# Patient Record
Sex: MALE | Race: WHITE | NOT HISPANIC OR LATINO | Employment: FULL TIME | URBAN - METROPOLITAN AREA
[De-identification: names, ages, dates, MRNs, and addresses within clinical notes are randomized per-mention and may not be internally consistent; named-entity substitution may affect disease eponyms.]

---

## 2022-10-06 ENCOUNTER — APPOINTMENT (EMERGENCY)
Dept: CT IMAGING | Facility: HOSPITAL | Age: 67
End: 2022-10-06
Payer: MEDICARE

## 2022-10-06 ENCOUNTER — TELEPHONE (OUTPATIENT)
Dept: UROLOGY | Facility: AMBULATORY SURGERY CENTER | Age: 67
End: 2022-10-06

## 2022-10-06 ENCOUNTER — HOSPITAL ENCOUNTER (EMERGENCY)
Facility: HOSPITAL | Age: 67
Discharge: HOME | End: 2022-10-06
Attending: EMERGENCY MEDICINE
Payer: MEDICARE

## 2022-10-06 VITALS
SYSTOLIC BLOOD PRESSURE: 134 MMHG | RESPIRATION RATE: 16 BRPM | HEART RATE: 60 BPM | WEIGHT: 195 LBS | OXYGEN SATURATION: 98 % | TEMPERATURE: 98.3 F | DIASTOLIC BLOOD PRESSURE: 72 MMHG | BODY MASS INDEX: 28.88 KG/M2 | HEIGHT: 69 IN

## 2022-10-06 DIAGNOSIS — R33.8 ACUTE URINARY RETENTION: ICD-10-CM

## 2022-10-06 DIAGNOSIS — K44.9 HIATAL HERNIA: ICD-10-CM

## 2022-10-06 DIAGNOSIS — N32.0 BLADDER OUTLET OBSTRUCTION: Primary | ICD-10-CM

## 2022-10-06 DIAGNOSIS — R79.89 ELEVATED SERUM CREATININE: ICD-10-CM

## 2022-10-06 DIAGNOSIS — M54.50 ACUTE LEFT-SIDED LOW BACK PAIN WITHOUT SCIATICA: ICD-10-CM

## 2022-10-06 DIAGNOSIS — K80.20 CHOLELITHIASIS: ICD-10-CM

## 2022-10-06 LAB
ALBUMIN SERPL BCP-MCNC: 4.1 G/DL (ref 3.5–5)
ALP SERPL-CCNC: 74 U/L (ref 46–116)
ALT SERPL W P-5'-P-CCNC: 32 U/L (ref 12–78)
ANION GAP SERPL CALCULATED.3IONS-SCNC: 9 MMOL/L (ref 4–13)
AST SERPL W P-5'-P-CCNC: 23 U/L (ref 5–45)
BASOPHILS # BLD AUTO: 0.05 THOUSANDS/ΜL (ref 0–0.1)
BASOPHILS NFR BLD AUTO: 1 % (ref 0–1)
BILIRUB SERPL-MCNC: 0.7 MG/DL (ref 0.2–1)
BILIRUB UR QL STRIP: NEGATIVE
BUN SERPL-MCNC: 14 MG/DL (ref 5–25)
CALCIUM SERPL-MCNC: 9.7 MG/DL (ref 8.3–10.1)
CHLORIDE SERPL-SCNC: 102 MMOL/L (ref 96–108)
CLARITY UR: CLEAR
CO2 SERPL-SCNC: 29 MMOL/L (ref 21–32)
COLOR UR: YELLOW
CREAT SERPL-MCNC: 1.36 MG/DL (ref 0.6–1.3)
EOSINOPHIL # BLD AUTO: 0.07 THOUSAND/ΜL (ref 0–0.61)
EOSINOPHIL NFR BLD AUTO: 1 % (ref 0–6)
ERYTHROCYTE [DISTWIDTH] IN BLOOD BY AUTOMATED COUNT: 14.2 % (ref 11.6–15.1)
GFR SERPL CREATININE-BSD FRML MDRD: 53 ML/MIN/1.73SQ M
GLUCOSE SERPL-MCNC: 133 MG/DL (ref 65–140)
GLUCOSE UR STRIP-MCNC: NEGATIVE MG/DL
HCT VFR BLD AUTO: 47.6 % (ref 36.5–49.3)
HGB BLD-MCNC: 16.1 G/DL (ref 12–17)
HGB UR QL STRIP.AUTO: NEGATIVE
IMM GRANULOCYTES # BLD AUTO: 0.05 THOUSAND/UL (ref 0–0.2)
IMM GRANULOCYTES NFR BLD AUTO: 1 % (ref 0–2)
KETONES UR STRIP-MCNC: NEGATIVE MG/DL
LEUKOCYTE ESTERASE UR QL STRIP: NEGATIVE
LIPASE SERPL-CCNC: 117 U/L (ref 73–393)
LYMPHOCYTES # BLD AUTO: 1.44 THOUSANDS/ΜL (ref 0.6–4.47)
LYMPHOCYTES NFR BLD AUTO: 14 % (ref 14–44)
MCH RBC QN AUTO: 29.5 PG (ref 26.8–34.3)
MCHC RBC AUTO-ENTMCNC: 33.8 G/DL (ref 31.4–37.4)
MCV RBC AUTO: 87 FL (ref 82–98)
MONOCYTES # BLD AUTO: 0.65 THOUSAND/ΜL (ref 0.17–1.22)
MONOCYTES NFR BLD AUTO: 6 % (ref 4–12)
NEUTROPHILS # BLD AUTO: 8.36 THOUSANDS/ΜL (ref 1.85–7.62)
NEUTS SEG NFR BLD AUTO: 77 % (ref 43–75)
NITRITE UR QL STRIP: NEGATIVE
NRBC BLD AUTO-RTO: 0 /100 WBCS
PH UR STRIP.AUTO: 6 [PH]
PLATELET # BLD AUTO: 224 THOUSANDS/UL (ref 149–390)
PMV BLD AUTO: 9 FL (ref 8.9–12.7)
POTASSIUM SERPL-SCNC: 4.3 MMOL/L (ref 3.5–5.3)
PROT SERPL-MCNC: 7.9 G/DL (ref 6.4–8.4)
PROT UR STRIP-MCNC: NEGATIVE MG/DL
RBC # BLD AUTO: 5.45 MILLION/UL (ref 3.88–5.62)
SODIUM SERPL-SCNC: 140 MMOL/L (ref 135–147)
SP GR UR STRIP.AUTO: 1.01 (ref 1–1.03)
UROBILINOGEN UR STRIP-ACNC: <2 MG/DL
WBC # BLD AUTO: 10.62 THOUSAND/UL (ref 4.31–10.16)

## 2022-10-06 PROCEDURE — 99284 EMERGENCY DEPT VISIT MOD MDM: CPT

## 2022-10-06 PROCEDURE — G1004 CDSM NDSC: HCPCS

## 2022-10-06 PROCEDURE — 99285 EMERGENCY DEPT VISIT HI MDM: CPT | Performed by: EMERGENCY MEDICINE

## 2022-10-06 PROCEDURE — 83690 ASSAY OF LIPASE: CPT | Performed by: EMERGENCY MEDICINE

## 2022-10-06 PROCEDURE — 96374 THER/PROPH/DIAG INJ IV PUSH: CPT

## 2022-10-06 PROCEDURE — 80053 COMPREHEN METABOLIC PANEL: CPT | Performed by: EMERGENCY MEDICINE

## 2022-10-06 PROCEDURE — 74176 CT ABD & PELVIS W/O CONTRAST: CPT

## 2022-10-06 PROCEDURE — 36415 COLL VENOUS BLD VENIPUNCTURE: CPT | Performed by: EMERGENCY MEDICINE

## 2022-10-06 PROCEDURE — 81003 URINALYSIS AUTO W/O SCOPE: CPT | Performed by: EMERGENCY MEDICINE

## 2022-10-06 PROCEDURE — 51798 US URINE CAPACITY MEASURE: CPT

## 2022-10-06 PROCEDURE — 85025 COMPLETE CBC W/AUTO DIFF WBC: CPT | Performed by: EMERGENCY MEDICINE

## 2022-10-06 PROCEDURE — 93005 ELECTROCARDIOGRAM TRACING: CPT

## 2022-10-06 RX ORDER — TAMSULOSIN HYDROCHLORIDE 0.4 MG/1
0.4 CAPSULE ORAL
Qty: 14 CAPSULE | Refills: 0 | Status: SHIPPED | OUTPATIENT
Start: 2022-10-06 | End: 2022-10-28 | Stop reason: SDUPTHER

## 2022-10-06 RX ORDER — KETOROLAC TROMETHAMINE 30 MG/ML
15 INJECTION, SOLUTION INTRAMUSCULAR; INTRAVENOUS ONCE
Status: COMPLETED | OUTPATIENT
Start: 2022-10-06 | End: 2022-10-06

## 2022-10-06 RX ORDER — LIDOCAINE 50 MG/G
1 PATCH TOPICAL ONCE
Status: DISCONTINUED | OUTPATIENT
Start: 2022-10-06 | End: 2022-10-06 | Stop reason: HOSPADM

## 2022-10-06 RX ADMIN — KETOROLAC TROMETHAMINE 15 MG: 30 INJECTION, SOLUTION INTRAMUSCULAR; INTRAVENOUS at 07:18

## 2022-10-06 RX ADMIN — LIDOCAINE 5% 1 PATCH: 700 PATCH TOPICAL at 07:17

## 2022-10-06 NOTE — TELEPHONE ENCOUNTER
Spoke with Briana Gresham and arnav  On speaker phone  Scheduled him at the [facility] office tomorrow with Dr Lowery Organ  Told them christina should hydrate with water - the blood is probably from the insertion of campbell as he has a large prostate - they stated the urine is an orangey color   Gave direction to the [facility] End office

## 2022-10-06 NOTE — PROGRESS NOTES
UROLOGY NEW CONSULT NOTE     CHIEF COMPLAINT   Bib Guzmán is a 78 y o  male with a complaint of   Chief Complaint   Patient presents with    Cystoscopy       History of Present Illness:     78 y o  male presented to the emergency room yesterday with left-sided flank pain  CT scan demonstrated significant hydronephrosis, and enlarged prostate with bladder diverticula  Patient had a Mendoza catheter placed for greater than 1 L of urinary retention  Was started on Flomax  Presents for discussion  Patient reports that he has not followed with medical providers regularly  Approximately 7-8 years ago, he did have a prostate exam was told he has an enlarged prostate  No prior PSA testing  Patient denies a family history of urinary issues or prostate cancer  Has been having progressive urinary symptoms oversewed many years  Does work as a   Past Medical History:   History reviewed  No pertinent past medical history  PAST SURGICAL HISTORY:     Past Surgical History:   Procedure Laterality Date    APPENDECTOMY      CYSTOSCOPY  10/07/2022    [place]       CURRENT MEDICATIONS:     Current Outpatient Medications   Medication Sig Dispense Refill    tamsulosin (FLOMAX) 0 4 mg Take 1 capsule (0 4 mg total) by mouth daily with dinner 14 capsule 0     No current facility-administered medications for this visit         ALLERGIES:   No Known Allergies    SOCIAL HISTORY:     Social History     Socioeconomic History    Marital status: /Civil Union     Spouse name: None    Number of children: None    Years of education: None    Highest education level: None   Occupational History    None   Tobacco Use    Smoking status: Never Smoker    Smokeless tobacco: Never Used   Vaping Use    Vaping Use: Never used   Substance and Sexual Activity    Alcohol use: Not Currently    Drug use: Never    Sexual activity: Yes     Partners: Female   Other Topics Concern    None   Social History Narrative    None     Social Determinants of Health     Financial Resource Strain: Not on file   Food Insecurity: Not on file   Transportation Needs: Not on file   Physical Activity: Not on file   Stress: Not on file   Social Connections: Not on file   Intimate Partner Violence: Not on file   Housing Stability: Not on file       SOCIAL HISTORY:   History reviewed  No pertinent family history  REVIEW OF SYSTEMS:     Review of Systems   Constitutional: Negative  Respiratory: Negative  Cardiovascular: Negative  Gastrointestinal: Negative  Genitourinary: Positive for difficulty urinating and penile pain  Musculoskeletal: Negative  Skin: Negative  Psychiatric/Behavioral: Negative  PHYSICAL EXAM:     /70 (BP Location: Left arm, Patient Position: Sitting, Cuff Size: Adult)   Pulse 66   Ht 5' 9" (1 753 m)   Wt 91 2 kg (201 lb)   BMI 29 68 kg/m²     General:  Healthy appearing male in no acute distress  They have a normal affect  There is not appear to be any gross neurologic defects or abnormalities  HEENT:  Normocephalic, atraumatic  Neck is supple without any palpable lymphadenopathy  Cardiovascular:  Patient has normal palpable distal radial pulses  There is no significant peripheral edema  No JVD is noted  Respiratory:  Patient has unlabored respirations  There is no audible wheeze or rhonchi  Abdomen:  Abdomen is with healed appendectomy surgical scars  Abdomen is soft and nontender  There is no tympany  Inguinal and umbilical hernia are not appreciated  Genitourinary: no penile lesions or discharge, no testicular masses or tenderness, no hernias, prostate is smooth without nodule    Musculoskeletal:  Patient does not have significant CVA tenderness in the  flank with palpation or percussion  They full range of motion in all 4 extremities  Strength in all 4 extremities appears congruent    Patient is able to ambulate without assistance or difficulty  Dermatologic:  Patient has no skin abnormalities or rashes  LABS:     CBC:   Lab Results   Component Value Date    WBC 10 62 (H) 10/06/2022    HGB 16 1 10/06/2022    HCT 47 6 10/06/2022    MCV 87 10/06/2022     10/06/2022       BMP:   Lab Results   Component Value Date    CALCIUM 9 7 10/06/2022    K 4 3 10/06/2022    CO2 29 10/06/2022     10/06/2022    BUN 14 10/06/2022    CREATININE 1 36 (H) 10/06/2022       IMAGING:     10/6/22  CT ABDOMEN AND PELVIS WITHOUT IV CONTRAST - LOW DOSE RENAL STONE      INDICATION:   Flank pain, kidney stone suspected  left flank pain      COMPARISON:  None      TECHNIQUE:  Low radiation dose thin section CT examination of the abdomen and pelvis was performed without intravenous or oral contrast according to a protocol specifically designed to evaluate for urinary tract calculus  Axial, sagittal, and coronal 2D   reformatted images were created from the source data and submitted for interpretation  Evaluation for pathology in the abdomen and pelvis that is unrelated to urinary tract calculi is limited        Radiation dose length product (DLP) for this visit:  415 mGy-cm   This examination, like all CT scans performed in the [facility], was performed utilizing techniques to minimize radiation dose exposure, including the use of iterative   reconstruction and automated exposure control      URINARY TRACT FINDINGS:     RIGHT KIDNEY AND URETER:  Moderate severe right hydroureteronephrosis is noted  No urinary calculi are identified      LEFT KIDNEY AND URETER:  Moderate to severe left hydroureteronephrosis is identified  No urinary calculi identified  Marked bladder distention is identified  Multiple bladder diverticula are present      URINARY BLADDER:  Unremarkable         ADDITIONAL FINDINGS:     LOWER CHEST:  Small hiatal hernia noted    No other clinically significant abnormality identified in the visualized lower chest      SOLID VISCERA: Limited low radiation dose noncontrast CT evaluation demonstrates no clinically significant abnormality of the imaged portions of the liver, spleen, pancreas, or adrenal glands        GALLBLADDER/BILIARY TREE:  There are gallstone(s) within the gallbladder, without pericholecystic inflammatory changes  No biliary dilatation      STOMACH AND BOWEL:  Unremarkable      APPENDIX:  No findings to suggest appendicitis      ABDOMINOPELVIC CAVITY:  No ascites  No pneumoperitoneum  No lymphadenopathy      REPRODUCTIVE ORGANS:  Unremarkable for patient's age      ABDOMINAL WALL/INGUINAL REGIONS:  Unremarkable      OSSEOUS STRUCTURES:  No acute fracture or destructive osseous lesion      IMPRESSION:     Marked bladder distention and moderate to severe bilateral hydroureteronephrosis  Multiple bladder diverticula  Findings likely on the basis of chronic bladder outlet obstruction  No obstructing calculi identified      Cholelithiasis      Small hiatal hernia  PROCEDURE:     SEE NOTE     ASSESSMENT:     78 y o  male with bladder outlet obstruction and hydronephrosis    PLAN:     Unfortunately, the patient was unable to void today  Mendoza catheter was replaced  Agree with Flomax in the interim  Patient will need follow-up ultrasound within the next 1-2 weeks to reassess hydronephrosis seen during ER visit  Given the signs of bladder trabeculation on cystoscopy and his ability to easily hold >500 cc of fluid, patient appears to have longstanding outlet obstruction  Patient will need at least 2-3 weeks of decompression before void trial re-approached  Patient will need an new PSA however given the recent Mendoza catheter placement and today's scope, would wait at least 2 weeks before checking  If the patient has a catheter in at that time, number could be falsely elevated      If the patient is unable to void at his upcoming trial, would consider urodynamic testing given the relatively small size of his prostate and minimal signs of outlet obstruction

## 2022-10-06 NOTE — ED PROVIDER NOTES
History  Chief Complaint   Patient presents with    Flank Pain     Pt reports left flank pain x 24 hours  Denies injury, denies hematuria  78year old male presents for evaluation of left flank pain which began while at work yesterday afternoon  Patient describes the pain as constant with occasional stabbing pains  Pain worsens with movement  No alleviating factors  No hematuria, dysuria or frequency  No history of renal stones in the past  Patient states he was unable to sleep last night due to the pain  Transient diaphoresis and nausea this morning  Patient has not taken anything for his symptoms today  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  No history of prior back strain  History provided by:  Patient  Flank Pain  Pain location:  L flank  Pain quality: stabbing    Pain radiates to:  Does not radiate  Onset quality:  Gradual  Duration:  1 day  Timing:  Constant  Progression:  Waxing and waning  Chronicity:  New  Relieved by:  Nothing  Worsened by: Movement  Ineffective treatments:  None tried  Associated symptoms: nausea    Associated symptoms: no chest pain, no constipation, no cough, no diarrhea, no fatigue, no fever, no shortness of breath, no sore throat and no vomiting        None       History reviewed  No pertinent past medical history  Past Surgical History:   Procedure Laterality Date    APPENDECTOMY         History reviewed  No pertinent family history  I have reviewed and agree with the history as documented      E-Cigarette/Vaping    E-Cigarette Use Never User      E-Cigarette/Vaping Substances    Nicotine No     THC No     CBD No     Flavoring No     Other No     Unknown No      Social History     Tobacco Use    Smoking status: Never Smoker    Smokeless tobacco: Never Used   Vaping Use    Vaping Use: Never used   Substance Use Topics    Alcohol use: Not Currently    Drug use: Never       Review of Systems   Constitutional: Positive for diaphoresis  Negative for appetite change, fatigue and fever  HENT: Negative for congestion and sore throat  Respiratory: Negative for cough and shortness of breath  Cardiovascular: Negative for chest pain  Gastrointestinal: Positive for nausea  Negative for abdominal pain, constipation, diarrhea and vomiting  Genitourinary: Positive for flank pain  Musculoskeletal: Negative for myalgias  Skin: Negative for rash and wound  Neurological: Negative for dizziness, syncope and headaches  All other systems reviewed and are negative  Physical Exam  Physical Exam  Vitals and nursing note reviewed  Constitutional:       General: He is not in acute distress  Appearance: He is well-developed  He is not toxic-appearing or diaphoretic  HENT:      Head: Normocephalic and atraumatic  Right Ear: External ear normal       Left Ear: External ear normal       Nose: Nose normal    Eyes:      General: No scleral icterus  Cardiovascular:      Rate and Rhythm: Normal rate and regular rhythm  Pulses: Normal pulses  Pulmonary:      Effort: Pulmonary effort is normal  No respiratory distress  Abdominal:      General: There is no distension  Tenderness: There is no abdominal tenderness  There is no right CVA tenderness or left CVA tenderness  Musculoskeletal:         General: No deformity  Normal range of motion  Comments: Muscular tenderness left lower back  No midline C/T/L spine tenderness  No step offs or deformities  Skin:     Findings: No rash  Neurological:      General: No focal deficit present  Mental Status: He is alert        Gait: Gait normal    Psychiatric:         Mood and Affect: Mood normal          Vital Signs  ED Triage Vitals [10/06/22 0654]   Temperature Pulse Respirations Blood Pressure SpO2   98 3 °F (36 8 °C) 60 16 134/72 98 %      Temp Source Heart Rate Source Patient Position - Orthostatic VS BP Location FiO2 (%)   Temporal Monitor Sitting Right arm --      Pain Score       5           Vitals:    10/06/22 0654   BP: 134/72   Pulse: 60   Patient Position - Orthostatic VS: Sitting         Visual Acuity      ED Medications  Medications   lidocaine (LIDODERM) 5 % patch 1 patch (1 patch Topical Medication Applied 10/6/22 0717)   sodium chloride 0 9 % bolus 1,000 mL (has no administration in time range)   ketorolac (TORADOL) injection 15 mg (15 mg Intravenous Given 10/6/22 0718)       Diagnostic Studies  Results Reviewed     Procedure Component Value Units Date/Time    UA w Reflex to Microscopic w Reflex to Culture [[id number]] Collected: 10/06/22 0745    Lab Status: Final result Specimen: Urine, Clean Catch Updated: 10/06/22 0753     Color, UA Yellow     Clarity, UA Clear     Specific Gravity, UA 1 015     pH, UA 6 0     Leukocytes, UA Negative     Nitrite, UA Negative     Protein, UA Negative mg/dl      Glucose, UA Negative mg/dl      Ketones, UA Negative mg/dl      Urobilinogen, UA <2 0 mg/dl      Bilirubin, UA Negative     Occult Blood, UA Negative    Comprehensive metabolic panel [[id number]]  (Abnormal) Collected: 10/06/22 0716    Lab Status: Final result Specimen: Blood from Arm, Right Updated: 10/06/22 0739     Sodium 140 mmol/L      Potassium 4 3 mmol/L      Chloride 102 mmol/L      CO2 29 mmol/L      ANION GAP 9 mmol/L      BUN 14 mg/dL      Creatinine 1 36 mg/dL      Glucose 133 mg/dL      Calcium 9 7 mg/dL      AST 23 U/L      ALT 32 U/L      Alkaline Phosphatase 74 U/L      Total Protein 7 9 g/dL      Albumin 4 1 g/dL      Total Bilirubin 0 70 mg/dL      eGFR 53 ml/min/1 73sq m     Narrative:      [place] guidelines for Chronic Kidney Disease (CKD):     Stage 1 with normal or high GFR (GFR > 90 mL/min/1 73 square meters)    Stage 2 Mild CKD (GFR = 60-89 mL/min/1 73 square meters)    Stage 3A Moderate CKD (GFR = 45-59 mL/min/1 73 square meters)    Stage 3B Moderate CKD (GFR = 30-44 mL/min/1 73 square meters)    Stage 4 Severe CKD (GFR = 15-29 mL/min/1 73 square meters)    Stage 5 End Stage CKD (GFR <15 mL/min/1 73 square meters)  Note: GFR calculation is accurate only with a steady state creatinine    Lipase [[id number]]  (Normal) Collected: 10/06/22 0716    Lab Status: Final result Specimen: Blood from Arm, Right Updated: 10/06/22 0739     Lipase 117 u/L     CBC and differential [[id number]]  (Abnormal) Collected: 10/06/22 0716    Lab Status: Final result Specimen: Blood from Arm, Right Updated: 10/06/22 0724     WBC 10 62 Thousand/uL      RBC 5 45 Million/uL      Hemoglobin 16 1 g/dL      Hematocrit 47 6 %      MCV 87 fL      MCH 29 5 pg      MCHC 33 8 g/dL      RDW 14 2 %      MPV 9 0 fL      Platelets 682 Thousands/uL      nRBC 0 /100 WBCs      Neutrophils Relative 77 %      Immat GRANS % 1 %      Lymphocytes Relative 14 %      Monocytes Relative 6 %      Eosinophils Relative 1 %      Basophils Relative 1 %      Neutrophils Absolute 8 36 Thousands/µL      Immature Grans Absolute 0 05 Thousand/uL      Lymphocytes Absolute 1 44 Thousands/µL      Monocytes Absolute 0 65 Thousand/µL      Eosinophils Absolute 0 07 Thousand/µL      Basophils Absolute 0 05 Thousands/µL                  CT renal stone study abdomen pelvis without contrast   Final Result by Swapna Herrera MD (10/06 4341)      Marked bladder distention and moderate to severe bilateral hydroureteronephrosis  Multiple bladder diverticula  Findings likely on the basis of chronic bladder outlet obstruction  No obstructing calculi identified  Cholelithiasis  Small hiatal hernia           Workstation performed: [id number]                    Procedures  ECG 12 Lead Documentation Only    Date/Time: 10/6/2022 7:23 AM  Performed by: Ines Boss MD  Authorized by: Ines Boss MD     Indications / Diagnosis:  Nausea and diaphoresis  ECG reviewed by me, the ED Provider: yes    Patient location:  ED  Previous ECG: Previous ECG:  Unavailable  Interpretation:     Interpretation: normal    Rate:     ECG rate:  56    ECG rate assessment: bradycardic    Rhythm:     Rhythm: sinus bradycardia    Ectopy:     Ectopy: none    QRS:     QRS axis:  Normal    QRS intervals:  Normal  Conduction:     Conduction: normal    ST segments:     ST segments:  Normal  T waves:     T waves: normal               ED Course  ED Course as of 10/06/22 0946   Thu Oct 06, 2022   0740 Creatinine(!): 1 36  No prior for comparison                                SBIRT 22yo+    Flowsheet Row Most Recent Value   SBIRT (23 yo +)    In order to provide better care to our patients, we are screening all of our patients for alcohol and drug use  Would it be okay to ask you these screening questions? Yes Filed at: 10/06/2022 3414   Initial Alcohol Screen: US AUDIT-C     1  How often do you have a drink containing alcohol? 0 Filed at: 10/06/2022 0705   2  How many drinks containing alcohol do you have on a typical day you are drinking? 0 Filed at: 10/06/2022 0705   3a  Male UNDER 65: How often do you have five or more drinks on one occasion? 0 Filed at: 10/06/2022 0705   3b  FEMALE Any Age, or MALE 65+: How often do you have 4 or more drinks on one occassion? 0 Filed at: 10/06/2022 0705   Audit-C Score 0 Filed at: 10/06/2022 3120   CHRISTY: How many times in the past year have you    Used an illegal drug or used a prescription medication for non-medical reasons? Never Filed at: 10/06/2022 0705                    MDM  Number of Diagnoses or Management Options  Acute left-sided low back pain without sciatica: new and requires workup  Acute urinary retention: new and requires workup  Bladder outlet obstruction: new and requires workup  Cholelithiasis: minor  Elevated serum creatinine: new and requires workup  Hiatal hernia: minor  Diagnosis management comments: 78year old male presents for evaluation of left flank pain   Left lumbar muscular tenderness on exam  Labs with slight elevation in creatinine  No baseline for comparison  CT stone protocol negative for stone, but does show evidence of bladder outlet obstruction with hydronephrosis  Post void residual >1L  Mendoza catheter placed  Flomax  Urology follow up  Amount and/or Complexity of Data Reviewed  Clinical lab tests: ordered and reviewed  Tests in the radiology section of CPT®: ordered and reviewed    Patient Progress  Patient progress: stable      Disposition  Final diagnoses:   Elevated serum creatinine   Bladder outlet obstruction   Hiatal hernia   Cholelithiasis   Acute left-sided low back pain without sciatica   Acute urinary retention     Time reflects when diagnosis was documented in both MDM as applicable and the Disposition within this note     Time User Action Codes Description Comment    10/6/2022  7:41 AM Ja Dunia Add [R79 89] Elevated serum creatinine     10/6/2022  8:41 AM Ja Dunia Add [N32 0] Bladder outlet obstruction     10/6/2022  8:42 AM Man Riding J Add [K44 9] Hiatal hernia     10/6/2022  8:42 AM Ja Dunia Add [K80 20] Cholelithiasis     10/6/2022  8:42 AM Ja Dunia Modify [R79 89] Elevated serum creatinine     10/6/2022  8:42 AM Ja Dunia Modify [N32 0] Bladder outlet obstruction     10/6/2022  8:42 AM Sudhakar Kraus Add [M54 50] Acute left-sided low back pain without sciatica     10/6/2022  9:12 AM Ja Dunia Add [R33 8] Acute urinary retention       ED Disposition     ED Disposition   Discharge    Condition   Stable    Date/Time   Thu Oct 6, 2022  9:43 AM    Comment   Jay Bermeo discharge to home/self care                 Follow-up Information     Follow up With Specialties Details Why Contact Info Additional [address] For Urology [facility] Urology Schedule an appointment as soon as possible for a visit in 1 week for re-evaluation Dipti Higgins [address] [id number] [phone] LT [address] Urology [facility], [facility], [facility] [place], [facility], [place], [address]     [address] Emergency Department Emergency Medicine Go to  If symptoms worsen [address] [place], [id number] [phone] [address] Emergency Department, [address] [place] [address], [facility], [address]          Patient's Medications   Discharge Prescriptions    TAMSULOSIN (FLOMAX) 0 4 MG    Take 1 capsule (0 4 mg total) by mouth daily with dinner       Start Date: 10/6/2022 End Date: --       Order Dose: 0 4 mg       Quantity: 14 capsule    Refills: 0           PDMP Review     None          ED Provider  Electronically Signed by           Jass Saez MD  10/06/22 6491

## 2022-10-06 NOTE — Clinical Note
Aristides Hutton was seen and treated in our emergency department on 10/6/2022  Diagnosis:     Rox [place]    He may return on this date: 10/10/2022         If you have any questions or concerns, please don't hesitate to call        Danny Cedillo MD    ______________________________           _______________          _______________  Hospital Representative                              Date                                Time
few crackles

## 2022-10-06 NOTE — TELEPHONE ENCOUNTER
Please Triage  New Patient    What is the reason for the patients appointment? ED f/u urinary retention blood in urine only since cath was placed     What office location does the patient prefer? [place]     Imaging/Lab Results:    Do we accept the patient's insurance or is the patient Self-Pay? Medicare   Insurance Provider:  Plan Type/Number:  Member ID#: Has the patient had any previous Urologist(s)? No     Have patient records been requested? If not are records showing in [address] [place] [address] Rd: records in [facility]     Has the patient had any outside testing done? No     Does the patient have a personal history of cancer?  No    Pt call [phone] Shiva Tobias (wife)

## 2022-10-07 ENCOUNTER — PROCEDURE VISIT (OUTPATIENT)
Dept: UROLOGY | Facility: CLINIC | Age: 67
End: 2022-10-07
Payer: MEDICARE

## 2022-10-07 VITALS
DIASTOLIC BLOOD PRESSURE: 70 MMHG | HEIGHT: 69 IN | HEART RATE: 66 BPM | SYSTOLIC BLOOD PRESSURE: 122 MMHG | WEIGHT: 201 LBS | BODY MASS INDEX: 29.77 KG/M2

## 2022-10-07 DIAGNOSIS — R33.8 ACUTE URINARY RETENTION: ICD-10-CM

## 2022-10-07 DIAGNOSIS — Z12.5 SCREENING FOR PROSTATE CANCER: ICD-10-CM

## 2022-10-07 DIAGNOSIS — R33.9 URINARY RETENTION: ICD-10-CM

## 2022-10-07 DIAGNOSIS — N32.3 DIVERTICULA, BLADDER: ICD-10-CM

## 2022-10-07 DIAGNOSIS — N13.30 HYDRONEPHROSIS, UNSPECIFIED HYDRONEPHROSIS TYPE: Primary | ICD-10-CM

## 2022-10-07 LAB — POST-VOID RESIDUAL VOLUME, ML POC: 555 ML

## 2022-10-07 PROCEDURE — 52000 CYSTOURETHROSCOPY: CPT | Performed by: UROLOGY

## 2022-10-07 PROCEDURE — 76872 US TRANSRECTAL: CPT | Performed by: UROLOGY

## 2022-10-07 PROCEDURE — 99204 OFFICE O/P NEW MOD 45 MIN: CPT | Performed by: UROLOGY

## 2022-10-07 PROCEDURE — 51798 US URINE CAPACITY MEASURE: CPT | Performed by: UROLOGY

## 2022-10-07 NOTE — PROGRESS NOTES
Cystoscopy     Date/Time 10/7/2022 8:46 AM     Performed by  Bret Rosado MD     Authorized by Bret Rosado MD      Universal Protocol:  Timeout called at: 10/7/2022 8:46 AM       Procedure Details:  Procedure type: cystoscopy    Patient tolerance: Patient tolerated the procedure well with no immediate complications    Additional Procedure Details:   Cystoscopy procedure note:    Risk and benefits of flexible cystoscopy were discussed  Informed consent was obtained  Urine dip was adequate for cystoscopy  The patient was placed in the supine position  His genitalia was prepped with Betadine and draped in a sterile fashion  Viscous 2% lidocaine jelly was instilled into the urethra and allowed dwell time for local anesthesia  Flexible cystoscopy was then performed using a 16F scope  The distal urethra and prostatic urethra were evaluated and were normal in course and caliber  Patient's prostate was short in length with minimal occlusion  Once inside the bladder, it was carefully inspected in a 360 degree fashion  Patient clearly had a very capacious bladder  Although I did not see a large diverticula, there was significant trabeculation  There was some adherent clot in the right dome from recent catheter placement  Both ureteral orifices in their orthotopic location were visualized with clear efflux of urine  Retroflexion for evaluation of the bladder neck was normal   No intravesical component noted  Overall this was a cystoscopy with signs of longstanding bladder outlet obstruction  Patient easily tolerated 500 cc of fluid without difficulty  Patient was then turned the left lateral decubitus position and transrectal ultrasound probe was placed after rectal exam   Prostate measured 37 g in 3 dimensional measurement  Patient was then given the opportunity to urinate in the uroflow commode  Patient was unable to void and postvoid residual was 550 cc    In a sterile fashion, 16 Hebrew coude tip Mendoza catheter was then placed to drain 550 cc of fluid  Patient tolerated procedure well

## 2022-10-08 LAB
ATRIAL RATE: 56 BPM
P AXIS: 64 DEGREES
PR INTERVAL: 142 MS
QRS AXIS: 33 DEGREES
QRSD INTERVAL: 82 MS
QT INTERVAL: 400 MS
QTC INTERVAL: 386 MS
T WAVE AXIS: 30 DEGREES
VENTRICULAR RATE: 56 BPM

## 2022-10-08 PROCEDURE — 93010 ELECTROCARDIOGRAM REPORT: CPT | Performed by: INTERNAL MEDICINE

## 2022-10-11 ENCOUNTER — HOSPITAL ENCOUNTER (OUTPATIENT)
Dept: ULTRASOUND IMAGING | Facility: HOSPITAL | Age: 67
Discharge: HOME | DRG: 698 | End: 2022-10-11
Attending: UROLOGY
Payer: MEDICARE

## 2022-10-11 DIAGNOSIS — R33.8 ACUTE URINARY RETENTION: ICD-10-CM

## 2022-10-11 PROCEDURE — 76775 US EXAM ABDO BACK WALL LIM: CPT

## 2022-10-12 ENCOUNTER — NURSE TRIAGE (OUTPATIENT)
Dept: OTHER | Facility: OTHER | Age: 67
End: 2022-10-12

## 2022-10-12 ENCOUNTER — APPOINTMENT (EMERGENCY)
Dept: CT IMAGING | Facility: HOSPITAL | Age: 67
DRG: 698 | End: 2022-10-12
Payer: MEDICARE

## 2022-10-12 ENCOUNTER — HOSPITAL ENCOUNTER (INPATIENT)
Facility: HOSPITAL | Age: 67
LOS: 3 days | Discharge: HOME | DRG: 698 | End: 2022-10-15
Attending: EMERGENCY MEDICINE | Admitting: INTERNAL MEDICINE
Payer: MEDICARE

## 2022-10-12 DIAGNOSIS — N12 PYELONEPHRITIS: Primary | ICD-10-CM

## 2022-10-12 DIAGNOSIS — R79.89 ABNORMAL LFTS: ICD-10-CM

## 2022-10-12 LAB
ALBUMIN SERPL BCP-MCNC: 3.5 G/DL (ref 3.5–5)
ALP SERPL-CCNC: 163 U/L (ref 46–116)
ALT SERPL W P-5'-P-CCNC: 304 U/L (ref 12–78)
ANION GAP SERPL CALCULATED.3IONS-SCNC: 8 MMOL/L (ref 4–13)
APTT PPP: 28 SECONDS (ref 23–37)
AST SERPL W P-5'-P-CCNC: 200 U/L (ref 5–45)
BACTERIA UR QL AUTO: ABNORMAL /HPF
BASOPHILS # BLD AUTO: 0.04 THOUSANDS/ΜL (ref 0–0.1)
BASOPHILS NFR BLD AUTO: 0 % (ref 0–1)
BILIRUB SERPL-MCNC: 1.4 MG/DL (ref 0.2–1)
BILIRUB UR QL STRIP: NEGATIVE
BUN SERPL-MCNC: 15 MG/DL (ref 5–25)
CALCIUM SERPL-MCNC: 9.6 MG/DL (ref 8.3–10.1)
CHLORIDE SERPL-SCNC: 100 MMOL/L (ref 96–108)
CLARITY UR: ABNORMAL
CO2 SERPL-SCNC: 25 MMOL/L (ref 21–32)
COLOR UR: YELLOW
CREAT SERPL-MCNC: 1.27 MG/DL (ref 0.6–1.3)
EOSINOPHIL # BLD AUTO: 0 THOUSAND/ΜL (ref 0–0.61)
EOSINOPHIL NFR BLD AUTO: 0 % (ref 0–6)
ERYTHROCYTE [DISTWIDTH] IN BLOOD BY AUTOMATED COUNT: 14.1 % (ref 11.6–15.1)
GFR SERPL CREATININE-BSD FRML MDRD: 58 ML/MIN/1.73SQ M
GLUCOSE SERPL-MCNC: 129 MG/DL (ref 65–140)
GLUCOSE UR STRIP-MCNC: NEGATIVE MG/DL
HCT VFR BLD AUTO: 43 % (ref 36.5–49.3)
HGB BLD-MCNC: 14.9 G/DL (ref 12–17)
HGB UR QL STRIP.AUTO: ABNORMAL
IMM GRANULOCYTES # BLD AUTO: 0.09 THOUSAND/UL (ref 0–0.2)
IMM GRANULOCYTES NFR BLD AUTO: 1 % (ref 0–2)
INR PPP: 0.98 (ref 0.84–1.19)
KETONES UR STRIP-MCNC: NEGATIVE MG/DL
LACTATE SERPL-SCNC: 1 MMOL/L (ref 0.5–2)
LEUKOCYTE ESTERASE UR QL STRIP: ABNORMAL
LYMPHOCYTES # BLD AUTO: 0.89 THOUSANDS/ΜL (ref 0.6–4.47)
LYMPHOCYTES NFR BLD AUTO: 5 % (ref 14–44)
MCH RBC QN AUTO: 29.9 PG (ref 26.8–34.3)
MCHC RBC AUTO-ENTMCNC: 34.7 G/DL (ref 31.4–37.4)
MCV RBC AUTO: 86 FL (ref 82–98)
MONOCYTES # BLD AUTO: 1.2 THOUSAND/ΜL (ref 0.17–1.22)
MONOCYTES NFR BLD AUTO: 6 % (ref 4–12)
MUCOUS THREADS UR QL AUTO: ABNORMAL
NEUTROPHILS # BLD AUTO: 17.43 THOUSANDS/ΜL (ref 1.85–7.62)
NEUTS SEG NFR BLD AUTO: 88 % (ref 43–75)
NITRITE UR QL STRIP: POSITIVE
NON-SQ EPI CELLS URNS QL MICRO: ABNORMAL /HPF
NRBC BLD AUTO-RTO: 0 /100 WBCS
OTHER STN SPEC: ABNORMAL
PH UR STRIP.AUTO: 7.5 [PH]
PLATELET # BLD AUTO: 197 THOUSANDS/UL (ref 149–390)
PMV BLD AUTO: 9 FL (ref 8.9–12.7)
POTASSIUM SERPL-SCNC: 4.1 MMOL/L (ref 3.5–5.3)
PROCALCITONIN SERPL-MCNC: 0.4 NG/ML
PROT SERPL-MCNC: 7.7 G/DL (ref 6.4–8.4)
PROT UR STRIP-MCNC: ABNORMAL MG/DL
PROTHROMBIN TIME: 13.7 SECONDS (ref 11.6–14.5)
RBC # BLD AUTO: 4.98 MILLION/UL (ref 3.88–5.62)
RBC #/AREA URNS AUTO: ABNORMAL /HPF
SODIUM SERPL-SCNC: 133 MMOL/L (ref 135–147)
SP GR UR STRIP.AUTO: 1.01 (ref 1–1.03)
UROBILINOGEN UR STRIP-ACNC: <2 MG/DL
WBC # BLD AUTO: 19.65 THOUSAND/UL (ref 4.31–10.16)
WBC #/AREA URNS AUTO: ABNORMAL /HPF

## 2022-10-12 PROCEDURE — 87186 SC STD MICRODIL/AGAR DIL: CPT | Performed by: EMERGENCY MEDICINE

## 2022-10-12 PROCEDURE — 96361 HYDRATE IV INFUSION ADD-ON: CPT

## 2022-10-12 PROCEDURE — 84145 PROCALCITONIN (PCT): CPT | Performed by: EMERGENCY MEDICINE

## 2022-10-12 PROCEDURE — 96365 THER/PROPH/DIAG IV INF INIT: CPT

## 2022-10-12 PROCEDURE — 80053 COMPREHEN METABOLIC PANEL: CPT | Performed by: EMERGENCY MEDICINE

## 2022-10-12 PROCEDURE — 74177 CT ABD & PELVIS W/CONTRAST: CPT

## 2022-10-12 PROCEDURE — 36415 COLL VENOUS BLD VENIPUNCTURE: CPT

## 2022-10-12 PROCEDURE — 83605 ASSAY OF LACTIC ACID: CPT | Performed by: EMERGENCY MEDICINE

## 2022-10-12 PROCEDURE — 87086 URINE CULTURE/COLONY COUNT: CPT | Performed by: EMERGENCY MEDICINE

## 2022-10-12 PROCEDURE — 81001 URINALYSIS AUTO W/SCOPE: CPT | Performed by: EMERGENCY MEDICINE

## 2022-10-12 PROCEDURE — 87040 BLOOD CULTURE FOR BACTERIA: CPT | Performed by: EMERGENCY MEDICINE

## 2022-10-12 PROCEDURE — 85730 THROMBOPLASTIN TIME PARTIAL: CPT | Performed by: EMERGENCY MEDICINE

## 2022-10-12 PROCEDURE — 85025 COMPLETE CBC W/AUTO DIFF WBC: CPT | Performed by: EMERGENCY MEDICINE

## 2022-10-12 PROCEDURE — 96375 TX/PRO/DX INJ NEW DRUG ADDON: CPT

## 2022-10-12 PROCEDURE — 99285 EMERGENCY DEPT VISIT HI MDM: CPT

## 2022-10-12 PROCEDURE — 87077 CULTURE AEROBIC IDENTIFY: CPT | Performed by: EMERGENCY MEDICINE

## 2022-10-12 PROCEDURE — G1004 CDSM NDSC: HCPCS

## 2022-10-12 PROCEDURE — 85610 PROTHROMBIN TIME: CPT | Performed by: EMERGENCY MEDICINE

## 2022-10-12 RX ORDER — CEFEPIME HYDROCHLORIDE 2 G/50ML
2000 INJECTION, SOLUTION INTRAVENOUS ONCE
Status: COMPLETED | OUTPATIENT
Start: 2022-10-12 | End: 2022-10-12

## 2022-10-12 RX ORDER — SODIUM CHLORIDE 9 MG/ML
3 INJECTION INTRAVENOUS ONCE
Status: COMPLETED | OUTPATIENT
Start: 2022-10-12 | End: 2022-10-12

## 2022-10-12 RX ADMIN — IOHEXOL 85 ML: 350 INJECTION, SOLUTION INTRAVENOUS at 22:13

## 2022-10-12 RX ADMIN — SODIUM CHLORIDE 3 ML: 9 INJECTION, SOLUTION INTRAMUSCULAR; INTRAVENOUS; SUBCUTANEOUS at 20:15

## 2022-10-12 RX ADMIN — CEFEPIME HYDROCHLORIDE 2000 MG: 2 INJECTION, SOLUTION INTRAVENOUS at 20:11

## 2022-10-12 RX ADMIN — SODIUM CHLORIDE 1000 ML: 0.9 INJECTION, SOLUTION INTRAVENOUS at 20:29

## 2022-10-12 NOTE — TELEPHONE ENCOUNTER
Regardin 8 fever and going up  post procedure - blood in cath bag   ----- Message from Alice Mccray MA sent at 10/12/2022  5:25 PM EDT -----  "I am calling bc I already spoke with the office earlier today, he came home wasn't feeling well  He just empied cath bag and there was a lot of blood   He is now running fever of 102 8 and really not feeling well "

## 2022-10-12 NOTE — TELEPHONE ENCOUNTER
White or yellow discharge at meatus is normal, body natural lubrication response to catheter  All good   Keep  F/u as planned

## 2022-10-12 NOTE — TELEPHONE ENCOUNTER
Pt's wife called in stating pt has discharge around the tube and his penis  She wants to know if this is normal  She is requesting a call back

## 2022-10-12 NOTE — TELEPHONE ENCOUNTER
Called and spoke with wife, states has a slight discharge from penis around cath, said cloudy white, does wipe off when cleaning  Wife states it almost looks like semen  States no fever/chills, no nausea/vomiting  No pain when urinating  Patient is back to work and increased activity   Advised to continue to monitor, advised increase hydration, ED precautions reviewed, will forward message to provider to advise

## 2022-10-12 NOTE — TELEPHONE ENCOUNTER
Reason for Disposition  • Fever > 100 4 F (38 0 C)    Answer Assessment - Initial Assessment Questions  1  SYMPTOM: "What's the main symptom you're concerned about?" (e g , frequency, incontinence)     Fever of 103 4 oral and small amount of blood in the urine  2  ONSET: "When did the this start?"      Fever began at 1600 today    3  PAIN: "Is there any pain?" If Yes, ask: "How bad is it?" (Scale: 1-10; mild, moderate, severe)      Denies     4  CAUSE: "What do you think is causing the symptoms?"      Unknown    5  OTHER SYMPTOMS: "Do you have any other symptoms?" (e g , fever, flank pain, blood in urine, pain with urination)      Denies    Patient was seen on 10/06 in the ER for bladder outlet obstruction and acute urinary retention  Patient has a Mendoza  He has cloudy white discharge from the penis around the tube  Patient came home from work around 4:00pm with a high fever      Protocols used: [facility]

## 2022-10-12 NOTE — TELEPHONE ENCOUNTER
Called and spoke with patients wife and advised him of Hannah Mendes PA-C's note  She verbalized understanding

## 2022-10-13 ENCOUNTER — APPOINTMENT (OUTPATIENT)
Dept: ULTRASOUND IMAGING | Facility: HOSPITAL | Age: 67
DRG: 698 | End: 2022-10-13
Payer: MEDICARE

## 2022-10-13 PROBLEM — K80.20 CHOLELITHIASIS: Status: ACTIVE | Noted: 2022-10-13

## 2022-10-13 PROBLEM — R33.9 URINARY RETENTION: Status: ACTIVE | Noted: 2022-10-13

## 2022-10-13 PROBLEM — R17 SERUM TOTAL BILIRUBIN ELEVATED: Status: ACTIVE | Noted: 2022-10-13

## 2022-10-13 PROBLEM — R74.01 TRANSAMINITIS: Status: ACTIVE | Noted: 2022-10-13

## 2022-10-13 PROBLEM — A41.9 SEPSIS WITHOUT ACUTE ORGAN DYSFUNCTION (HCC): Status: ACTIVE | Noted: 2022-10-13

## 2022-10-13 PROBLEM — N12 PYELONEPHRITIS: Status: ACTIVE | Noted: 2022-10-13

## 2022-10-13 PROBLEM — N13.30 HYDROURETERONEPHROSIS: Status: ACTIVE | Noted: 2022-10-13

## 2022-10-13 LAB
ALBUMIN SERPL BCP-MCNC: 2.9 G/DL (ref 3.5–5)
ALP SERPL-CCNC: 154 U/L (ref 46–116)
ALT SERPL W P-5'-P-CCNC: 251 U/L (ref 12–78)
ANION GAP SERPL CALCULATED.3IONS-SCNC: 6 MMOL/L (ref 4–13)
AST SERPL W P-5'-P-CCNC: 133 U/L (ref 5–45)
BILIRUB SERPL-MCNC: 1.7 MG/DL (ref 0.2–1)
BUN SERPL-MCNC: 14 MG/DL (ref 5–25)
CALCIUM ALBUM COR SERPL-MCNC: 10.1 MG/DL (ref 8.3–10.1)
CALCIUM SERPL-MCNC: 9.2 MG/DL (ref 8.3–10.1)
CHLORIDE SERPL-SCNC: 105 MMOL/L (ref 96–108)
CO2 SERPL-SCNC: 25 MMOL/L (ref 21–32)
CREAT SERPL-MCNC: 1.16 MG/DL (ref 0.6–1.3)
ERYTHROCYTE [DISTWIDTH] IN BLOOD BY AUTOMATED COUNT: 14.4 % (ref 11.6–15.1)
GFR SERPL CREATININE-BSD FRML MDRD: 65 ML/MIN/1.73SQ M
GLUCOSE SERPL-MCNC: 119 MG/DL (ref 65–140)
HCT VFR BLD AUTO: 40.9 % (ref 36.5–49.3)
HGB BLD-MCNC: 13.6 G/DL (ref 12–17)
MAGNESIUM SERPL-MCNC: 1.8 MG/DL (ref 1.6–2.6)
MCH RBC QN AUTO: 29.1 PG (ref 26.8–34.3)
MCHC RBC AUTO-ENTMCNC: 33.3 G/DL (ref 31.4–37.4)
MCV RBC AUTO: 88 FL (ref 82–98)
PHOSPHATE SERPL-MCNC: 2.5 MG/DL (ref 2.3–4.1)
PLATELET # BLD AUTO: 184 THOUSANDS/UL (ref 149–390)
PMV BLD AUTO: 9.3 FL (ref 8.9–12.7)
POTASSIUM SERPL-SCNC: 4.2 MMOL/L (ref 3.5–5.3)
PROCALCITONIN SERPL-MCNC: 0.49 NG/ML
PROT SERPL-MCNC: 6.8 G/DL (ref 6.4–8.4)
RBC # BLD AUTO: 4.67 MILLION/UL (ref 3.88–5.62)
SODIUM SERPL-SCNC: 136 MMOL/L (ref 135–147)
WBC # BLD AUTO: 19.61 THOUSAND/UL (ref 4.31–10.16)

## 2022-10-13 PROCEDURE — 99232 SBSQ HOSP IP/OBS MODERATE 35: CPT | Performed by: PHYSICIAN ASSISTANT

## 2022-10-13 PROCEDURE — 99222 1ST HOSP IP/OBS MODERATE 55: CPT

## 2022-10-13 PROCEDURE — 85027 COMPLETE CBC AUTOMATED: CPT | Performed by: PHYSICIAN ASSISTANT

## 2022-10-13 PROCEDURE — 99223 1ST HOSP IP/OBS HIGH 75: CPT | Performed by: PHYSICIAN ASSISTANT

## 2022-10-13 PROCEDURE — 84100 ASSAY OF PHOSPHORUS: CPT | Performed by: PHYSICIAN ASSISTANT

## 2022-10-13 PROCEDURE — 83735 ASSAY OF MAGNESIUM: CPT | Performed by: PHYSICIAN ASSISTANT

## 2022-10-13 PROCEDURE — 80053 COMPREHEN METABOLIC PANEL: CPT | Performed by: PHYSICIAN ASSISTANT

## 2022-10-13 PROCEDURE — 84145 PROCALCITONIN (PCT): CPT | Performed by: PHYSICIAN ASSISTANT

## 2022-10-13 PROCEDURE — 76705 ECHO EXAM OF ABDOMEN: CPT

## 2022-10-13 RX ORDER — PYRIDOXINE HCL (VITAMIN B6) 100 MG
100 TABLET ORAL DAILY
COMMUNITY

## 2022-10-13 RX ORDER — ENOXAPARIN SODIUM 100 MG/ML
40 INJECTION SUBCUTANEOUS DAILY
Status: DISCONTINUED | OUTPATIENT
Start: 2022-10-13 | End: 2022-10-15 | Stop reason: HOSPADM

## 2022-10-13 RX ORDER — FAMOTIDINE 20 MG/1
20 TABLET, FILM COATED ORAL 2 TIMES DAILY
COMMUNITY

## 2022-10-13 RX ORDER — ACETAMINOPHEN 325 MG/1
650 TABLET ORAL EVERY 6 HOURS PRN
Status: DISCONTINUED | OUTPATIENT
Start: 2022-10-13 | End: 2022-10-15 | Stop reason: HOSPADM

## 2022-10-13 RX ORDER — CALCIUM CARBONATE 200(500)MG
1000 TABLET,CHEWABLE ORAL DAILY PRN
Status: DISCONTINUED | OUTPATIENT
Start: 2022-10-13 | End: 2022-10-15 | Stop reason: HOSPADM

## 2022-10-13 RX ORDER — DIPHENOXYLATE HYDROCHLORIDE AND ATROPINE SULFATE 2.5; .025 MG/1; MG/1
1 TABLET ORAL DAILY
COMMUNITY

## 2022-10-13 RX ORDER — TAMSULOSIN HYDROCHLORIDE 0.4 MG/1
0.4 CAPSULE ORAL
Status: DISCONTINUED | OUTPATIENT
Start: 2022-10-13 | End: 2022-10-15 | Stop reason: HOSPADM

## 2022-10-13 RX ORDER — CEFEPIME HYDROCHLORIDE 2 G/50ML
2000 INJECTION, SOLUTION INTRAVENOUS EVERY 8 HOURS
Status: DISCONTINUED | OUTPATIENT
Start: 2022-10-13 | End: 2022-10-15 | Stop reason: HOSPADM

## 2022-10-13 RX ORDER — ZINC GLUCONATE 50 MG
50 TABLET ORAL DAILY
COMMUNITY

## 2022-10-13 RX ORDER — ONDANSETRON 2 MG/ML
4 INJECTION INTRAMUSCULAR; INTRAVENOUS EVERY 6 HOURS PRN
Status: DISCONTINUED | OUTPATIENT
Start: 2022-10-13 | End: 2022-10-15 | Stop reason: HOSPADM

## 2022-10-13 RX ORDER — SODIUM CHLORIDE 9 MG/ML
125 INJECTION, SOLUTION INTRAVENOUS CONTINUOUS
Status: DISCONTINUED | OUTPATIENT
Start: 2022-10-13 | End: 2022-10-15

## 2022-10-13 RX ORDER — SENNOSIDES 8.6 MG
1 TABLET ORAL
Status: DISCONTINUED | OUTPATIENT
Start: 2022-10-13 | End: 2022-10-15 | Stop reason: HOSPADM

## 2022-10-13 RX ORDER — MULTIVIT WITH MINERALS/LUTEIN
500 TABLET ORAL DAILY
COMMUNITY

## 2022-10-13 RX ORDER — OMEGA-3S/DHA/EPA/FISH OIL/D3 300MG-1000
400 CAPSULE ORAL DAILY
COMMUNITY

## 2022-10-13 RX ADMIN — ACETAMINOPHEN 650 MG: 325 TABLET ORAL at 09:43

## 2022-10-13 RX ADMIN — ENOXAPARIN SODIUM 40 MG: 100 INJECTION SUBCUTANEOUS at 09:13

## 2022-10-13 RX ADMIN — CEFEPIME HYDROCHLORIDE 2000 MG: 2 INJECTION, SOLUTION INTRAVENOUS at 20:00

## 2022-10-13 RX ADMIN — ACETAMINOPHEN 650 MG: 325 TABLET ORAL at 20:00

## 2022-10-13 RX ADMIN — CEFEPIME HYDROCHLORIDE 2000 MG: 2 INJECTION, SOLUTION INTRAVENOUS at 04:44

## 2022-10-13 RX ADMIN — SODIUM CHLORIDE 125 ML/HR: 0.9 INJECTION, SOLUTION INTRAVENOUS at 10:04

## 2022-10-13 RX ADMIN — CEFEPIME HYDROCHLORIDE 2000 MG: 2 INJECTION, SOLUTION INTRAVENOUS at 12:30

## 2022-10-13 RX ADMIN — SODIUM CHLORIDE 125 ML/HR: 0.9 INJECTION, SOLUTION INTRAVENOUS at 00:50

## 2022-10-13 RX ADMIN — TAMSULOSIN HYDROCHLORIDE 0.4 MG: 0.4 CAPSULE ORAL at 17:51

## 2022-10-13 NOTE — PROGRESS NOTES
Progress Note - General Surgery   Araceli Quan 78 y o  male MRN: [id number]  Unit/Bed#: -01 Encounter: [id number]    Assessment/ Plan:  Pyelonephritis with bilateral hydro nephrosis  -likely in the setting of recent instrumentation to catheter insertion  - UA grossly positive for infection  - IV antibiotics started per primary team  - monitor urine output  - flush Q 4 hours  - urine culture pending  - CT scan abdomen pelvis on 10/12/2022: With moderate to severe right hydroureter nephrosis, mild left hydronephrosis  - right upper quadrant ultrasound with right mild hydronephrosis  Cholelithiasis but no evidence of acute cholecystitis      Urinary retention  - patient presented to the ED on 10/06 with urinary retention and a Mendoza was placed  - outpatient cystoscopy performed on 10/07  Reveals signs of longstanding bladder outlet obstruction  Patient was unable to avoid in the office so a catheter was placed  - manual irrigation   - continue Flomax      Subjective/Objective   Chief Complaint:  Difficulty urinating    Subjective:  Patient doing okay with catheter in place  He has fevers, chills, shortness of breath or chest pain    Objective:     Blood pressure 136/75, pulse 77, temperature 100 3 °F (37 9 °C), resp  rate 17, height 5' 9" (1 753 m), weight 92 2 kg (203 lb 4 2 oz), SpO2 95 %  ,Body mass index is 30 02 kg/m²        Intake/Output Summary (Last 24 hours) at 10/13/2022 1252  Last data filed at 10/13/2022 0948  Gross per 24 hour   Intake 1410 ml   Output 1825 ml   Net -415 ml       Invasive Devices  Report    Peripheral Intravenous Line  Duration           Peripheral IV 10/12/22 Left Antecubital <1 day          Drain  Duration           Urethral Catheter Non-latex 16 Fr  7 days                Physical Exam: /75   Pulse 77   Temp 100 3 °F (37 9 °C)   Resp 17   Ht 5' 9" (1 753 m)   Wt 92 2 kg (203 lb 4 2 oz)   SpO2 95%   BMI 30 02 kg/m²   General appearance: alert and oriented, in no acute distress  Lungs: clear to auscultation bilaterally  Heart: regular rate and rhythm, S1, S2 normal, no murmur, click, rub or gallop  Abdomen: soft, non-tender; bowel sounds normal; no masses,  no organomegaly  Extremities: extremities normal, warm and well-perfused; no cyanosis, clubbing, or edema    Lab, Imaging and other studies:  CBC:   Lab Results   Component Value Date    WBC 19 61 (H) 10/13/2022    HGB 13 6 10/13/2022    HCT 40 9 10/13/2022    MCV 88 10/13/2022     10/13/2022    MCH 29 1 10/13/2022    MCHC 33 3 10/13/2022    RDW 14 4 10/13/2022    MPV 9 3 10/13/2022    NRBC 0 10/12/2022   , CMP:   Lab Results   Component Value Date    SODIUM 136 10/13/2022    K 4 2 10/13/2022     10/13/2022    CO2 25 10/13/2022    BUN 14 10/13/2022    CREATININE 1 16 10/13/2022    CALCIUM 9 2 10/13/2022     (H) 10/13/2022     (H) 10/13/2022    ALKPHOS 154 (H) 10/13/2022    EGFR 65 10/13/2022     VTE Pharmacologic Prophylaxis: Heparin  VTE Mechanical Prophylaxis: sequential compression device

## 2022-10-13 NOTE — ASSESSMENT & PLAN NOTE
· Seen on CT a/P: "Moderate to severe right hydroureteronephrosis which appears increased since prior ultrasound    Mild left hydronephrosis and parapelvic cysts "  · Urology okay with admission at [facility]  · Mendoza draining and flushing well  · Formal urology consult

## 2022-10-13 NOTE — ASSESSMENT & PLAN NOTE
· Total bilirubin elevated at 1 40 with alk-phos at 163   · CT with cholelithiasis, right upper quadrant ordered

## 2022-10-13 NOTE — ASSESSMENT & PLAN NOTE
Leena from [facility] called for orders for pt to have skilled nursing and physical therapy.  She was admitted to the hospital for pancreatitis and was in rehab but pt has decided not to return to rehab but go home   · Sirs criteria met on admission:  Tachycardia and leukocytosis   · Suspected source is pyelonephritis as evidence by UA and CT with hydronephrosis   · Procalcitonin 0 40   · Recheck in morning  · Continue cefepime due to recent instrumentation   · Blood cultures x2, pending   · Urine culture, pending  · No severe sepsis criteria met

## 2022-10-13 NOTE — ASSESSMENT & PLAN NOTE
· Sudden onset chills and fever with T-max of 103 4° F 10/12 afternoon   · Mendoza catheter present x1 week   · Patient reports catheter has been draining well, however he does have some hematuria at the end of his workday that resolves by evening   · UA with innumerable bacteria and wbc's  · ED spoke with Urology, who felt patient could stay at this hospital to receive IV antibiotics   · Continue cefepime   · Urine culture, pending

## 2022-10-13 NOTE — ASSESSMENT & PLAN NOTE
· Mendoza catheter placed in ED on 10/06   · Followed up with Urology on 10/07 with Mendoza replaced due to ongoing urinary retention  · 10/7 Cystoscopy with signs of longstanding bladder outlet obstruction with relatively small size of prostate minimal signs of bowel obstruction  · Mendoza catheter Q 4 hour hand irrigation  · Urology consult  · Continue Flomax

## 2022-10-13 NOTE — ASSESSMENT & PLAN NOTE
· CT with cholelithiasis but no signs of cholecystitis  · Labs reveal new transaminitis from 1 week ago at ,  - concern for obstructive bilirubin pattern with T bili at 1 409 alk-phos at 163  · Patient denies any right upper quadrant abdominal pain or vomiting, he does admit to some nausea with fever yesterday  · Obtain right upper quadrant ultrasound, if signs of cholecystitis consult surgery

## 2022-10-13 NOTE — PLAN OF CARE
Problem: PAIN - ADULT  Goal: Verbalizes/displays adequate comfort level or baseline comfort level  Description: Interventions:  - Encourage patient to monitor pain and request assistance  - Assess pain using appropriate pain scale  - Administer analgesics based on type and severity of pain and evaluate response  - Implement non-pharmacological measures as appropriate and evaluate response  - Consider cultural and social influences on pain and pain management  - Notify physician/advanced practitioner if interventions unsuccessful or patient reports new pain  Outcome: Progressing     Problem: INFECTION - ADULT  Goal: Absence or prevention of progression during hospitalization  Description: INTERVENTIONS:  - Assess and monitor for signs and symptoms of infection  - Monitor lab/diagnostic results  - Monitor all insertion sites, i e  indwelling lines, tubes, and drains  - Monitor endotracheal if appropriate and nasal secretions for changes in amount and color  - [place]ote appropriate cooling/warming therapies per order  - Administer medications as ordered  - Instruct and encourage patient and family to use good hand hygiene technique  - Identify and instruct in appropriate isolation precautions for identified infection/condition  Outcome: Progressing     Problem: SAFETY ADULT  Goal: Patient will remain free of falls  Description: INTERVENTIONS:  - Educate patient/family on patient safety including physical limitations  - Instruct patient to call for assistance with activity   - Consult OT/PT to assist with strengthening/mobility   - Keep Call bell within reach  - Keep bed low and locked with side rails adjusted as appropriate  - Keep care items and personal belongings within reach  - Initiate and maintain comfort rounds  - Make Fall Risk Sign visible to staff  - Offer Toileting every 2 Hours, in advance of need  - Obtain necessary fall risk management equipment: nonskid footwear  - Apply yellow socks and bracelet for high fall risk patients  - Consider moving patient to room near nurses station  Outcome: Progressing     Problem: DISCHARGE PLANNING  Goal: Discharge to home or other facility with appropriate resources  Description: INTERVENTIONS:  - Identify barriers to discharge w/patient and caregiver  - Arrange for needed discharge resources and transportation as appropriate  - Identify discharge learning needs (meds, wound care, etc )  - Arrange for interpretive services to assist at discharge as needed  - Refer to Case Management Department for coordinating discharge planning if the patient needs post-hospital services based on physician/advanced practitioner order or complex needs related to functional status, cognitive ability, or social support system  Outcome: Progressing     Problem: Knowledge Deficit  Goal: Patient/family/caregiver demonstrates understanding of disease process, treatment plan, medications, and discharge instructions  Description: Complete learning assessment and assess knowledge base    Interventions:  - Provide teaching at level of understanding  - Provide teaching via preferred learning methods  Outcome: Progressing     Problem: GENITOURINARY - ADULT  Goal: Maintains or returns to baseline urinary function  Description: INTERVENTIONS:  - Assess urinary function  - Encourage oral fluids to ensure adequate hydration if ordered  - Administer IV fluids as ordered to ensure adequate hydration  - Administer ordered medications as needed  - Offer frequent toileting  - Follow urinary retention protocol if ordered  Outcome: Progressing  Goal: Absence of urinary retention  Description: INTERVENTIONS:  - Assess patient’s ability to void and empty bladder  - Monitor I/O  - Bladder scan as needed  - Discuss with physician/AP medications to alleviate retention as needed  - Discuss catheterization for long term situations as appropriate  Outcome: Progressing  Goal: Urinary catheter remains patent  Description: INTERVENTIONS:  - Assess patency of urinary catheter  - If patient has a chronic campbell, consider changing catheter if non-functioning  - Follow guidelines for intermittent irrigation of non-functioning urinary catheter  Outcome: Progressing     Problem: METABOLIC, FLUID AND ELECTROLYTES - ADULT  Goal: Electrolytes maintained within normal limits  Description: INTERVENTIONS:  - Monitor labs and assess patient for signs and symptoms of electrolyte imbalances  - Administer electrolyte replacement as ordered  - Monitor response to electrolyte replacements, including repeat lab results as appropriate  - Instruct patient on fluid and nutrition as appropriate  Outcome: Progressing

## 2022-10-13 NOTE — ED PROVIDER NOTES
History  Chief Complaint   Patient presents with   • Fever - 9 weeks to 74 years     Fever at home todat 103 4 at 1700  Has had urinary catheter x1 weeks  HPI    Prior to Admission Medications   Prescriptions Last Dose Informant Patient Reported? Taking?   tamsulosin (FLOMAX) 0 4 mg  Self No No   Sig: Take 1 capsule (0 4 mg total) by mouth daily with dinner      Facility-Administered Medications: None       No past medical history on file  Past Surgical History:   Procedure Laterality Date   • APPENDECTOMY     • CYSTOSCOPY  10/07/2022    [place]       No family history on file  I have reviewed and agree with the history as documented      E-Cigarette/Vaping   • E-Cigarette Use Never User      E-Cigarette/Vaping Substances   • Nicotine No    • THC No    • CBD No    • Flavoring No    • Other No    • Unknown No      Social History     Tobacco Use   • Smoking status: Never Smoker   • Smokeless tobacco: Never Used   Vaping Use   • Vaping Use: Never used   Substance Use Topics   • Alcohol use: Not Currently   • Drug use: Never       Review of Systems    Physical Exam  Physical Exam    Vital Signs  ED Triage Vitals [10/12/22 1837]   Temperature Pulse Respirations Blood Pressure SpO2   99 8 °F (37 7 °C) 103 16 123/62 98 %      Temp Source Heart Rate Source Patient Position - Orthostatic VS BP Location FiO2 (%)   Temporal Monitor Sitting Right arm --      Pain Score       No Pain           Vitals:    10/12/22 1837   BP: 123/62   Pulse: 103   Patient Position - Orthostatic VS: Sitting         Visual Acuity      ED Medications  Medications   sodium chloride (PF) 0 9 % injection 3 mL (has no administration in time range)   cefepime (MAXIPIME) IVPB (premix in dextrose) 2,000 mg 50 mL (has no administration in time range)       Diagnostic Studies  Results Reviewed     Procedure Component Value Units Date/Time    Blood culture #1 [[id number]]     Lab Status: No result Specimen: Blood     Blood culture #2 [[id number]] Lab Status: No result Specimen: Blood     Lactic acid [[id number]]     Lab Status: No result Specimen: Blood     Procalcitonin [[id number]]     Lab Status: No result Specimen: Blood     Protime-INR [[id number]]     Lab Status: No result Specimen: Blood     APTT [[id number]]     Lab Status: No result Specimen: Blood     UA w Reflex to Microscopic w Reflex to Culture [[id number]]  (Abnormal) Collected: 10/12/22 1839    Lab Status: Final result Specimen: Urine, Indwelling Mendoza Catheter Updated: 10/12/22 1920     Color, UA Yellow     Clarity, UA Cloudy     Specific Gravity, UA 1 015     pH, UA 7 5     Leukocytes, UA Large     Nitrite, UA Positive     Protein,  (2+) mg/dl      Glucose, UA Negative mg/dl      Ketones, UA Negative mg/dl      Urobilinogen, UA <2 0 mg/dl      Bilirubin, UA Negative     Occult Blood, UA Large    Urine Microscopic [[id number]]  (Abnormal) Collected: 10/12/22 1839    Lab Status: Final result Specimen: Urine, Indwelling Mendoza Catheter Updated: 10/12/22 1920     RBC, UA 30-50 /hpf      WBC, UA Innumerable /hpf      Epithelial Cells None Seen /hpf      Bacteria, UA Innumerable /hpf      MUCUS THREADS Occasional     OTHER OBSERVATIONS WBCs Clumped    Urine culture [[id number]] Collected: 10/12/22 1839    Lab Status:  In process Specimen: Urine, Indwelling Mendoza Catheter Updated: 10/12/22 1919    Comprehensive metabolic panel [[id number]]  (Abnormal) Collected: 10/12/22 1839    Lab Status: Final result Specimen: Blood from Arm, Right Updated: 10/12/22 1909     Sodium 133 mmol/L      Potassium 4 1 mmol/L      Chloride 100 mmol/L      CO2 25 mmol/L      ANION GAP 8 mmol/L      BUN 15 mg/dL      Creatinine 1 27 mg/dL      Glucose 129 mg/dL      Calcium 9 6 mg/dL       U/L       U/L      Alkaline Phosphatase 163 U/L      Total Protein 7 7 g/dL      Albumin 3 5 g/dL      Total Bilirubin 1 40 mg/dL      eGFR 58 ml/min/1 73sq m     Narrative:      [place] guidelines for Chronic Kidney Disease (CKD):   •  Stage 1 with normal or high GFR (GFR > 90 mL/min/1 73 square meters)  •  Stage 2 Mild CKD (GFR = 60-89 mL/min/1 73 square meters)  •  Stage 3A Moderate CKD (GFR = 45-59 mL/min/1 73 square meters)  •  Stage 3B Moderate CKD (GFR = 30-44 mL/min/1 73 square meters)  •  Stage 4 Severe CKD (GFR = 15-29 mL/min/1 73 square meters)  •  Stage 5 End Stage CKD (GFR <15 mL/min/1 73 square meters)  Note: GFR calculation is accurate only with a steady state creatinine    CBC and differential [[id number]]  (Abnormal) Collected: 10/12/22 1839    Lab Status: Final result Specimen: Blood from Arm, Right Updated: 10/12/22 1849     WBC 19 65 Thousand/uL      RBC 4 98 Million/uL      Hemoglobin 14 9 g/dL      Hematocrit 43 0 %      MCV 86 fL      MCH 29 9 pg      MCHC 34 7 g/dL      RDW 14 1 %      MPV 9 0 fL      Platelets 071 Thousands/uL      nRBC 0 /100 WBCs      Neutrophils Relative 88 %      Immat GRANS % 1 %      Lymphocytes Relative 5 %      Monocytes Relative 6 %      Eosinophils Relative 0 %      Basophils Relative 0 %      Neutrophils Absolute 17 43 Thousands/µL      Immature Grans Absolute 0 09 Thousand/uL      Lymphocytes Absolute 0 89 Thousands/µL      Monocytes Absolute 1 20 Thousand/µL      Eosinophils Absolute 0 00 Thousand/µL      Basophils Absolute 0 04 Thousands/µL                  No orders to display              Procedures  Procedures         ED Course                                             MDM    Disposition  Final diagnoses:   None     ED Disposition     None      Follow-up Information    None         Patient's Medications   Discharge Prescriptions    No medications on file       No discharge procedures on file      PDMP Review     None          ED Provider  Electronically Signed by

## 2022-10-13 NOTE — CASE MANAGEMENT
Case Management Assessment & Discharge Planning Note    Patient name Joyce Souza  Location /-00 MRN [id number]  : 1955 Date 10/13/2022       Current Admission Date: 10/12/2022  Current Admission Diagnosis:Pyelonephritis   Patient Active Problem List    Diagnosis Date Noted   • Pyelonephritis 10/13/2022   • Urinary retention 10/13/2022   • Cholelithiasis 10/13/2022   • Transaminitis 10/13/2022   • Serum total bilirubin elevated 10/13/2022   • Sepsis without acute organ dysfunction ([facility] [address] ) 10/13/2022   • Hydroureteronephrosis, right 10/13/2022      LOS (days): 1  Geometric Mean LOS (GMLOS) (days): 3 50  Days to GMLOS:2 9     OBJECTIVE:    Risk of Unplanned Readmission Score: 8 71     Current admission status: Inpatient    Preferred Pharmacy:   [address]  [address] [facility] [address]  Phone: [phone] Fax: [phone]    Primary Care Provider: No primary care provider on file  Primary Insurance: MEDICARE  Secondary Insurance:     ASSESSMENT:  [address], [address] Representative - Spouse   Primary Phone: [phone] [facility])  Home Phone: [phone]               Advance Directives  Does patient have a [address]?: No  Was patient offered paperwork?: Yes (Declined  Per Pt's wife, meeting with  in [place] with paperwork)  Does patient currently have a Health Care decision maker?: Yes, please see Health Care Proxy section  Does patient have Advance Directives?: No  Was patient offered paperwork?: Yes (Declined   Per Pt's wife, meeting with  in [place] with paperwork)  Primary Contact: Ashaflorachelle Both: wife: [phone]    Readmission Root Cause  30 Day Readmission: No    Patient Information  Admitted from[de-identified] Home  Mental Status: Alert  During Assessment patient was accompanied by: Spouse  Assessment information provided by[de-identified] Patient  Primary Caregiver: Self  Support Systems: Spouse/significant other, Family members  [place] of Residence: [address] do you live in?: [address] [facility] entry access options   Select all that apply : Stairs  Number of steps to enter home : 6  Do the steps have railings?: Yes  Type of Current Residence: [place]  In the last 12 months, was there a time when you were not able to pay the mortgage or rent on time?: No  In the last 12 months, how many places have you lived?: 1  In the last 12 months, was there a time when you did not have a steady place to sleep or slept in a shelter (including now)?: No  Homeless/housing insecurity resource given?: N/A  Living Arrangements: Lives w/ Spouse/significant other, Lives w/ Extended Family  Is patient a ?: No    Activities of Daily Living Prior to Admission  Functional Status: Independent  Completes ADLs independently?: Yes  Ambulates independently?: Yes  Does patient use assisted devices?: No  Does patient currently own DME?: No  Does patient have a history of Outpatient Therapy (PT/OT)?: No  Does the patient have a history of Short-Term Rehab?: No  Does patient have a history of HHC?: No  Does patient currently have [address]?: No    Patient Information Continued  Income Source: Pension/senior care  Does patient have prescription coverage?: Yes  Within the past 12 months, you worried that your food would run out before you got the money to buy more : Never true  Within the past 12 months, the food you bought just didn't last and you didn't have money to get more : Never true  Food insecurity resource given?: N/A  Does patient receive dialysis treatments?: No  Does patient have a history of substance abuse?: No  Does patient have a history of Mental Health Diagnosis?: No    Means of Transportation  Means of Transport to Appts[de-identified] Drives Self  In the past 12 months, has lack of transportation kept you from medical appointments or from getting medications?: No  In the past 12 months, has lack of transportation kept you from meetings, work, or from getting things needed for daily living?: No  Was application for public transport provided?: N/A    DISCHARGE DETAILS:  Additional Comments: Met with Pt  Pt's wife at bedside  Discussed role of case management  Pt lives with wife and mother-in-law in 4sh, 5-6 steps with railings to enter  Independent PTA  No DME  No hx of VNA/SNF/MH&D/A  Drives  Pt does not have PCP but in agreement for infoLink Card  InfoLink Card for assistance in finding PCP  Pt plans to return home and does not anticipate discharge needs

## 2022-10-13 NOTE — CONSULTS
Consultation - Urology   Joyce Souza 78 y o  male MRN: [id number]  Unit/Bed#: -01 Encounter: [id number]      Assessment/Plan      Assessment/Plan:    Pyelonephritis:  - patient presenting with fevers, Tmax 103 4  - likely in the setting recent instrumentation and catheter insertion    - UA grossly positive for infection, microscopy with innumerable wbc and bacteria  - agree with cefepime, follow up on urine cx  - monitor UO and ensure catheter is flushing with q4h manual irrigations    Urinary retention:  - recently seen in the ED on 10/6 with urinary retention  Bladder scan revealed >1L urine per documentation  Catheter was inserted  - outpatient cystoscopy performed on 10/7  Revealed signs of long standing bladder outlet obstruction  Patient was unable to void after the procedure with a bladder scan of 500, so catheter was replaced in the office    - continue flomax  - manual irrigations ordered q4h    Hydronephrosis, bilateral:  - CT shows moderate to severe right sided hydroureteronephrosis as well as mild left sided hydronephrosis  - Patient currently has a Mendoza catheter which appears to be draining well  - continue manual irrigations  - recommend kidney/bladder ultrasound in 48 hrs to re-assess and ensure hydro is resolving  Ultrasound ordered for 10/15    Sepsis:  - as evident by tachycardia to 103 and leukocytosis of 19  - likely secondary to pyelonephritis  - continue cefepime  - follow up blood cultures and urine cultures  - monitor vitals and labs  Supportive care  Elevated Tbili, transaminitis:  - CT showing cholelithiasis  - patient without RUQ pain  Denies N/V   Denies pain associated with meals   - [facility] has ordered RUQ US to further assess lab derangements      History of Present Illness   Attending: Redd Brand MD  Reason for Consult / Principal Problem:  Urinary retention, bilateral pyelonephritis, bilateral hydro  HPI: Joyce Souza is a 78y o  year old male with recent diagnosis of urinary retention and cystoscopy performed on 10/07 who presents with a fever that started suddenly this afternoon, T-max 103 4° F  Patient states he took 2 Tylenol and fever came down to 99° F  Otherwise he denies abdominal pain, flank pain, chills, nausea, vomiting  Denies any changes in his appetite  Denies any pain associated with meals  Patient states he was seen in the ED a week ago due to urinary retention, states he had a catheter placed  He then followed up with Urology in the next day and underwent cystoscopy  After which, patient states he was unable to urinate so they replaced the catheter  Patient states he was supposed to have a void trial in 2 weeks from catheter placement  Patient states that he has noted pink tinged urine occasionally in the tubing, but states this usually resolves on its own by the end of the day  Patient states prior to 10/6, he has never had any urologic complaints  States he has been taking his Flomax  Inpatient consult to Urology  Consult performed by: Ana Guillaume PA-C  Consult ordered by: Calli Blair PA-C          Review of Systems   Constitutional: Positive for fever  Negative for appetite change and chills  HENT: Negative for ear pain and sore throat  Eyes: Negative for pain and visual disturbance  Respiratory: Negative for cough and shortness of breath  Cardiovascular: Negative for chest pain and palpitations  Gastrointestinal: Negative for abdominal pain, constipation, diarrhea, nausea and vomiting  Genitourinary: Positive for difficulty urinating and hematuria  Negative for dysuria and flank pain  Musculoskeletal: Negative for arthralgias and back pain  Skin: Negative for color change and rash  Neurological: Negative for syncope and light-headedness  All other systems reviewed and are negative  Historical Information   History reviewed  No pertinent past medical history    Past Surgical History:   Procedure Laterality Date   • APPENDECTOMY     • CYSTOSCOPY  10/07/2022    [facility]     Social History   Social History     Substance and Sexual Activity   Alcohol Use Not Currently     @DRUGHX  E-Cigarette/Vaping   • E-Cigarette Use Never User      E-Cigarette/Vaping Substances   • Nicotine No    • THC No    • CBD No    • Flavoring No    • Other No    • Unknown No      Social History     Tobacco Use   Smoking Status Never Smoker   Smokeless Tobacco Never Used     Family History: History reviewed  No pertinent family history  Meds/Allergies   PTA meds:   Prior to Admission Medications   Prescriptions Last Dose Informant Patient Reported? Taking?   tamsulosin (FLOMAX) 0 4 mg 10/12/2022 at Unknown time Self No Yes   Sig: Take 1 capsule (0 4 mg total) by mouth daily with dinner      Facility-Administered Medications: None     No Known Allergies    Objective   Vitals: Blood pressure 139/76, pulse 83, temperature 99 8 °F (37 7 °C), temperature source Oral, resp  rate 17, weight 90 7 kg (200 lb), SpO2 95 %  I/O last 24 hours: In: 1410 [P O :360; IV Piggyback:1050]  Out: 450 [Urine:450]    Invasive Devices  Report    Peripheral Intravenous Line  Duration           Peripheral IV 10/12/22 Left Antecubital <1 day          Drain  Duration           Urethral Catheter Non-latex 16 Fr  6 days                Physical Exam  Vitals and nursing note reviewed  Constitutional:       General: He is not in acute distress  Appearance: He is well-developed  He is not ill-appearing  Comments: Patient appears tired  Patient is alert and oriented  HENT:      Head: Normocephalic and atraumatic  Eyes:      Extraocular Movements: Extraocular movements intact  Conjunctiva/sclera: Conjunctivae normal    Cardiovascular:      Rate and Rhythm: Normal rate and regular rhythm  Heart sounds: No murmur heard  Pulmonary:      Effort: Pulmonary effort is normal  No respiratory distress  Breath sounds: Normal breath sounds  Abdominal:      General: Bowel sounds are normal  There is no distension  Palpations: Abdomen is soft  Tenderness: There is no abdominal tenderness  There is no right CVA tenderness, left CVA tenderness, guarding or rebound  Genitourinary:     Comments: Mendoza catheter in place draining cloudy yellow urine  Musculoskeletal:      Cervical back: Neck supple  Skin:     General: Skin is warm and dry  Neurological:      General: No focal deficit present  Mental Status: He is alert  Psychiatric:         Mood and Affect: Mood normal          Lab Results:   I have personally reviewed pertinent reports  CBC:   Lab Results   Component Value Date    WBC 19 65 (H) 10/12/2022    HGB 14 9 10/12/2022    HCT 43 0 10/12/2022    MCV 86 10/12/2022     10/12/2022    MCH 29 9 10/12/2022    MCHC 34 7 10/12/2022    RDW 14 1 10/12/2022    MPV 9 0 10/12/2022    NRBC 0 10/12/2022     CMP:   Lab Results   Component Value Date    SODIUM 133 (L) 10/12/2022     10/12/2022    CO2 25 10/12/2022    BUN 15 10/12/2022    CREATININE 1 27 10/12/2022    CALCIUM 9 6 10/12/2022     (H) 10/12/2022     (H) 10/12/2022    ALKPHOS 163 (H) 10/12/2022    EGFR 58 10/12/2022     Urinalysis:   Lab Results   Component Value Date    COLORU Yellow 10/12/2022    CLARITYU Cloudy 10/12/2022    SPECGRAV 1 015 10/12/2022    PHUR 7 5 10/12/2022    LEUKOCYTESUR Large (A) 10/12/2022    NITRITE Positive (A) 10/12/2022    GLUCOSEU Negative 10/12/2022    KETONESU Negative 10/12/2022    BILIRUBINUR Negative 10/12/2022    BLOODU Large (A) 10/12/2022     Urine Culture: No results found for: URINECX  Imaging Studies: I have personally reviewed pertinent reports  EKG, Pathology, and Other Studies: I have personally reviewed pertinent reports      VTE Prophylaxis: Enoxaparin (Lovenox)    Code Status: Level 1 - Full Code  Advance Directive and Living Will:      Power of :    POLST:      Brooklyn Mnedoza

## 2022-10-13 NOTE — H&P
[facility]  H&P- Jay Bermeo 1955, 78 y o  male MRN: [id number]  Unit/Bed#: -01 Encounter: [id number]  Primary Care Provider: No primary care provider on file  Date and time admitted to hospital: 10/12/2022  7:49 PM    * Pyelonephritis  Assessment & Plan  · Sudden onset chills and fever with T-max of 103 4° F 10/12 afternoon   · Mendoza catheter present x1 week   · Patient reports catheter has been draining well, however he does have some hematuria at the end of his workday that resolves by evening   · UA with innumerable bacteria and wbc's  · ED spoke with Urology, who felt patient could stay at this hospital to receive IV antibiotics   · Continue cefepime   · Urine culture, pending    Urinary retention  Assessment & Plan  · Mendoza catheter placed in ED on 10/06   · Followed up with Urology on 10/07 with Mendoza replaced due to ongoing urinary retention  · 10/7 Cystoscopy with signs of longstanding bladder outlet obstruction with relatively small size of prostate minimal signs of bowel obstruction  · Mendoza catheter Q 4 hour hand irrigation  · Urology consult  · Continue Flomax    Cholelithiasis  Assessment & Plan  · CT with cholelithiasis but no signs of cholecystitis  · Labs reveal new transaminitis from 1 week ago at ,  - concern for obstructive bilirubin pattern with T bili at 1 409 alk-phos at 163  · Patient denies any right upper quadrant abdominal pain or vomiting, he does admit to some nausea with fever yesterday  · Obtain right upper quadrant ultrasound, if signs of cholecystitis consult surgery    Transaminitis  Assessment & Plan  · ,   · Right upper quadrant ultrasound ordered    Hydroureteronephrosis, right  Assessment & Plan  · Seen on CT a/P: "Moderate to severe right hydroureteronephrosis which appears increased since prior ultrasound    Mild left hydronephrosis and parapelvic cysts "  · Urology okay with admission at [address]  · Mendoza draining and flushing well  · Formal urology consult    Serum total bilirubin elevated  Assessment & Plan  · Total bilirubin elevated at 1 40 with alk-phos at 163   · CT with cholelithiasis, right upper quadrant ordered    Sepsis without acute organ dysfunction ([facility] [address] )  Assessment & Plan  · Sirs criteria met on admission:  Tachycardia and leukocytosis   · Suspected source is pyelonephritis as evidence by UA and CT with hydronephrosis   · Procalcitonin 0 40   · Recheck in morning  · Continue cefepime due to recent instrumentation   · Blood cultures x2, pending   · Urine culture, pending  · No severe sepsis criteria met    VTE Pharmacologic Prophylaxis: VTE Score: 5 High Risk (Score >/= 5) - Pharmacological DVT Prophylaxis Ordered: enoxaparin (Lovenox)  Sequential Compression Devices Ordered  Code Status: Level 1 - Full Code   Discussion with family: Will call wife at 6:00 a m        Anticipated Length of Stay: Patient will be admitted on an inpatient basis with an anticipated length of stay of greater than 2 midnights secondary to Acute pyelonephritis, hydroureteronephrosis, serum total bilirubin elevated, transaminitis, sepsis, urinary retention, cholelithiasis  Total Time for Visit, including Counseling / Coordination of Care: 60 minutes Greater than 50% of this total time spent on direct patient counseling and coordination of care  Chief Complaint: "I had a fever in the middle of the afternoon"    History of Present Illness:  Carolyn Bradley is a 78 y o  male with a PMH of recent Mendoza catheter placement on 10/07 due to urinary retention, as well as cystoscopy performed who presents with sudden onset fever yesterday afternoon with T-max of 103 4° F  Fever improved with Tylenol  Associated with nausea  Denies flank pain  Denies any myalgias, chills, abdominal pain, vomiting, change in bowel habits, numbness, or weakness  Patient reports his Mendoza catheter has been draining well    He does note a fruit punch color at the end of a workday, however this resolves by nighttime  He denies any right upper quadrant abdominal pain after eating  No other acute symptoms noted  Review of Systems:  Review of Systems   Constitutional: Positive for fever  Negative for chills  HENT: Negative for congestion  Respiratory: Negative for cough and shortness of breath  Cardiovascular: Negative for chest pain and leg swelling  Gastrointestinal: Positive for nausea  Negative for abdominal pain, constipation, diarrhea and vomiting  Genitourinary: Positive for difficulty urinating and hematuria  Negative for dysuria and flank pain  Musculoskeletal: Negative for gait problem  Neurological: Negative for weakness and numbness  All other systems reviewed and are negative  Past Medical and Surgical History:   History reviewed  No pertinent past medical history  Past Surgical History:   Procedure Laterality Date   • APPENDECTOMY     • CYSTOSCOPY  10/07/2022    [facility]       Meds/Allergies:  Prior to Admission medications    Medication Sig Start Date End Date Taking? Authorizing Provider   tamsulosin (FLOMAX) 0 4 mg Take 1 capsule (0 4 mg total) by mouth daily with dinner 10/6/22  Yes Edgardo Herbert MD     I have reviewed home medications with patient personally  Allergies: No Known Allergies    Social History:  Marital Status: /Civil Union   Occupation:  Delivers [employer]  Patient Pre-hospital Living Situation: Home, With spouse  Patient Pre-hospital Level of Mobility: walks works a physically active job  Patient Pre-hospital Diet Restrictions:  None  Substance Use History:   Social History     Substance and Sexual Activity   Alcohol Use Not Currently     Social History     Tobacco Use   Smoking Status Never Smoker   Smokeless Tobacco Never Used     Social History     Substance and Sexual Activity   Drug Use Never       Family History:  History reviewed   No pertinent family history  Physical Exam:     Vitals:   Blood Pressure: 139/76 (10/13/22 0035)  Pulse: 83 (10/13/22 0035)  Temperature: 99 8 °F (37 7 °C) (10/13/22 0035)  Temp Source: Oral (10/13/22 0035)  Respirations: 17 (10/13/22 0035)  Weight - Scale: 90 7 kg (200 lb) (10/12/22 1837)  SpO2: 95 % (10/13/22 0035)    Physical Exam  Vitals and nursing note reviewed  Constitutional:       Comments: Appears tired  Pleasant and conversational    Hugo Becki:      Head: Normocephalic  Nose: Nose normal       Mouth/Throat:      Mouth: Mucous membranes are moist    Eyes:      Extraocular Movements: Extraocular movements intact  Conjunctiva/sclera: Conjunctivae normal    Cardiovascular:      Rate and Rhythm: Normal rate and regular rhythm  Pulses: Normal pulses  Heart sounds: No murmur heard  Pulmonary:      Effort: Pulmonary effort is normal       Breath sounds: Normal breath sounds  Abdominal:      General: Abdomen is flat  Bowel sounds are normal  There is no distension  Palpations: Abdomen is soft  Tenderness: There is no abdominal tenderness  There is no guarding or rebound  Comments: Negative Casillas sign   Musculoskeletal:         General: Normal range of motion  Cervical back: Normal range of motion  Right lower leg: No edema  Left lower leg: No edema  Skin:     General: Skin is warm and dry  Coloration: Skin is not pale  Neurological:      General: No focal deficit present  Mental Status: He is alert and oriented to person, place, and time  Psychiatric:         Mood and Affect: Mood normal          Thought Content:  Thought content normal           Additional Data:     Lab Results:  Results from last 7 days   Lab Units 10/12/22  1839   WBC Thousand/uL 19 65*   HEMOGLOBIN g/dL 14 9   HEMATOCRIT % 43 0   PLATELETS Thousands/uL 197   NEUTROS PCT % 88*   LYMPHS PCT % 5*   MONOS PCT % 6   EOS PCT % 0     Results from last 7 days   Lab Units 10/12/22  1839   SODIUM mmol/L 133*   POTASSIUM mmol/L 4 1   CHLORIDE mmol/L 100   CO2 mmol/L 25   BUN mg/dL 15   CREATININE mg/dL 1 27   ANION GAP mmol/L 8   CALCIUM mg/dL 9 6   ALBUMIN g/dL 3 5   TOTAL BILIRUBIN mg/dL 1 40*   ALK PHOS U/L 163*   ALT U/L 304*   AST U/L 200*   GLUCOSE RANDOM mg/dL 129     Results from last 7 days   Lab Units 10/12/22  2006   INR  0 98             Results from last 7 days   Lab Units 10/12/22  2006   LACTIC ACID mmol/L 1 0   PROCALCITONIN ng/ml 0 40*       Imaging: Reviewed radiology reports from this admission including: abdominal/pelvic CT  CT abdomen pelvis with contrast   Final Result by Nathanael Chua MD (10/12 7409)      1  Moderate to severe right hydroureteronephrosis which appears increased since prior ultrasound  Mild left hydronephrosis and parapelvic cysts  2   Irregular thickening of the urinary bladder wall, correlate clinically for cystitis  3   Enlarged prostate, correlate for bladder outlet obstruction  4   Cholelithiasis without evidence of cholecystitis  Workstation performed: [id number]         US right upper quadrant    (Results Pending)       EKG and Other Studies Reviewed on Admission:   · EKG: Sinus bradycardia with normal QT interval     ** Please Note: This note has been constructed using a voice recognition system   **

## 2022-10-13 NOTE — SEPSIS NOTE
Sepsis Note   Joyce Souza 78 y o  male MRN: [id number]  Unit/Bed#: ED 01 Encounter: [id number]       qSOFA     Row Name 10/12/22 2030 10/12/22 2015 10/12/22 1837          Altered mental status GCS < 15 -- 0 --      Respiratory Rate > / =22 -- 0 0      Systolic BP < / =896 0 0 0      Q Sofa Score 0 0 0                 Initial Sepsis Screening     Row Name 10/12/22 2104 10/12/22 2016             Is the patient's history suggestive of a new or worsening infection? Yes (Proceed)  -RP Yes (Proceed)  -RP       Suspected source of infection urinary tract infection  -RP urinary tract infection  -RP       Are two or more of the following signs & symptoms of infection both present and new to the patient? Yes (Proceed)  -RP --       Indicate SIRS criteria Leukocytosis (WBC > 96773 IJL); Hyperthemia > 38 3C (100 9F)  -RP Leukocytosis (WBC > 79265 IJL)  -RP       If the answer is yes to both questions, suspicion of sepsis is present -- --       If severe sepsis is present AND tissue hypoperfusion perists in the hour after fluid resuscitation or lactate > 4, the patient meets criteria for SEPTIC SHOCK -- --       Are any of the following organ dysfunction criteria present within 6 hours of suspected infection and SIRS criteria that are NOT considered to be chronic conditions?  No  -RP --       Organ dysfunction -- --       Date of presentation of severe sepsis -- --       Time of presentation of severe sepsis -- --       Tissue hypoperfusion persists in the hour after crystalloid fluid administration, evidenced, by either: -- --       Was hypotension present within one hour of the conclusion of crystalloid fluid administration? -- --       Date of presentation of septic shock -- --       Time of presentation of septic shock -- --             User Key  (r) = Recorded By, (t) = Taken By, (c) = Cosigned By    [address] Name Provider Type    RP Lou Cain MD Physician

## 2022-10-14 VITALS
HEIGHT: 69 IN | TEMPERATURE: 99.7 F | HEART RATE: 71 BPM | RESPIRATION RATE: 17 BRPM | WEIGHT: 198.6 LBS | SYSTOLIC BLOOD PRESSURE: 141 MMHG | OXYGEN SATURATION: 94 % | BODY MASS INDEX: 29.41 KG/M2 | DIASTOLIC BLOOD PRESSURE: 79 MMHG

## 2022-10-14 LAB
ALBUMIN SERPL BCP-MCNC: 2.6 G/DL (ref 3.5–5)
ALP SERPL-CCNC: 172 U/L (ref 46–116)
ALT SERPL W P-5'-P-CCNC: 188 U/L (ref 12–78)
ANION GAP SERPL CALCULATED.3IONS-SCNC: 7 MMOL/L (ref 4–13)
AST SERPL W P-5'-P-CCNC: 69 U/L (ref 5–45)
BASOPHILS # BLD AUTO: 0.04 THOUSANDS/ΜL (ref 0–0.1)
BASOPHILS NFR BLD AUTO: 0 % (ref 0–1)
BILIRUB SERPL-MCNC: 1.2 MG/DL (ref 0.2–1)
BUN SERPL-MCNC: 12 MG/DL (ref 5–25)
CALCIUM ALBUM COR SERPL-MCNC: 10.3 MG/DL (ref 8.3–10.1)
CALCIUM SERPL-MCNC: 9.2 MG/DL (ref 8.3–10.1)
CHLORIDE SERPL-SCNC: 106 MMOL/L (ref 96–108)
CO2 SERPL-SCNC: 24 MMOL/L (ref 21–32)
CREAT SERPL-MCNC: 1.16 MG/DL (ref 0.6–1.3)
EOSINOPHIL # BLD AUTO: 0.03 THOUSAND/ΜL (ref 0–0.61)
EOSINOPHIL NFR BLD AUTO: 0 % (ref 0–6)
ERYTHROCYTE [DISTWIDTH] IN BLOOD BY AUTOMATED COUNT: 14.6 % (ref 11.6–15.1)
GFR SERPL CREATININE-BSD FRML MDRD: 65 ML/MIN/1.73SQ M
GLUCOSE SERPL-MCNC: 106 MG/DL (ref 65–140)
HCT VFR BLD AUTO: 42.3 % (ref 36.5–49.3)
HGB BLD-MCNC: 13.8 G/DL (ref 12–17)
IMM GRANULOCYTES # BLD AUTO: 0.1 THOUSAND/UL (ref 0–0.2)
IMM GRANULOCYTES NFR BLD AUTO: 1 % (ref 0–2)
LYMPHOCYTES # BLD AUTO: 1.57 THOUSANDS/ΜL (ref 0.6–4.47)
LYMPHOCYTES NFR BLD AUTO: 10 % (ref 14–44)
MCH RBC QN AUTO: 29.3 PG (ref 26.8–34.3)
MCHC RBC AUTO-ENTMCNC: 32.6 G/DL (ref 31.4–37.4)
MCV RBC AUTO: 90 FL (ref 82–98)
MONOCYTES # BLD AUTO: 1.01 THOUSAND/ΜL (ref 0.17–1.22)
MONOCYTES NFR BLD AUTO: 7 % (ref 4–12)
NEUTROPHILS # BLD AUTO: 12.89 THOUSANDS/ΜL (ref 1.85–7.62)
NEUTS SEG NFR BLD AUTO: 82 % (ref 43–75)
NRBC BLD AUTO-RTO: 0 /100 WBCS
PLATELET # BLD AUTO: 195 THOUSANDS/UL (ref 149–390)
PMV BLD AUTO: 9.5 FL (ref 8.9–12.7)
POTASSIUM SERPL-SCNC: 4.3 MMOL/L (ref 3.5–5.3)
PROT SERPL-MCNC: 6.9 G/DL (ref 6.4–8.4)
RBC # BLD AUTO: 4.71 MILLION/UL (ref 3.88–5.62)
SODIUM SERPL-SCNC: 137 MMOL/L (ref 135–147)
WBC # BLD AUTO: 15.64 THOUSAND/UL (ref 4.31–10.16)

## 2022-10-14 PROCEDURE — 99232 SBSQ HOSP IP/OBS MODERATE 35: CPT | Performed by: PHYSICIAN ASSISTANT

## 2022-10-14 PROCEDURE — 85025 COMPLETE CBC W/AUTO DIFF WBC: CPT | Performed by: HOSPITALIST

## 2022-10-14 PROCEDURE — 80053 COMPREHEN METABOLIC PANEL: CPT | Performed by: HOSPITALIST

## 2022-10-14 PROCEDURE — 99232 SBSQ HOSP IP/OBS MODERATE 35: CPT | Performed by: UROLOGY

## 2022-10-14 RX ADMIN — ENOXAPARIN SODIUM 40 MG: 100 INJECTION SUBCUTANEOUS at 08:19

## 2022-10-14 RX ADMIN — CEFEPIME HYDROCHLORIDE 2000 MG: 2 INJECTION, SOLUTION INTRAVENOUS at 13:10

## 2022-10-14 RX ADMIN — TAMSULOSIN HYDROCHLORIDE 0.4 MG: 0.4 CAPSULE ORAL at 16:53

## 2022-10-14 RX ADMIN — CEFEPIME HYDROCHLORIDE 2000 MG: 2 INJECTION, SOLUTION INTRAVENOUS at 20:19

## 2022-10-14 RX ADMIN — CEFEPIME HYDROCHLORIDE 2000 MG: 2 INJECTION, SOLUTION INTRAVENOUS at 03:40

## 2022-10-14 RX ADMIN — SODIUM CHLORIDE 125 ML/HR: 0.9 INJECTION, SOLUTION INTRAVENOUS at 11:46

## 2022-10-14 RX ADMIN — SODIUM CHLORIDE 125 ML/HR: 0.9 INJECTION, SOLUTION INTRAVENOUS at 03:40

## 2022-10-14 RX ADMIN — CALCIUM CARBONATE (ANTACID) CHEW TAB 500 MG 1000 MG: 500 CHEW TAB at 21:51

## 2022-10-14 RX ADMIN — ACETAMINOPHEN 650 MG: 325 TABLET ORAL at 08:17

## 2022-10-14 RX ADMIN — ACETAMINOPHEN 650 MG: 325 TABLET ORAL at 21:51

## 2022-10-14 RX ADMIN — SODIUM CHLORIDE 125 ML/HR: 0.9 INJECTION, SOLUTION INTRAVENOUS at 21:51

## 2022-10-14 NOTE — ASSESSMENT & PLAN NOTE
· Sudden onset chills and fever with T-max of 103 4° F 10/12 afternoon   · Mendoza catheter present x1 week   · UA with innumerable bacteria and wbc's  · ED spoke with Urology, who felt patient could stay at this hospital to receive IV antibiotics   · Currently on IV cefepime  · Prelim urine culture growing Gram-negative rods likely E coli  · Blood cultures negative x2 after 24 hours

## 2022-10-14 NOTE — ASSESSMENT & PLAN NOTE
· Sirs criteria met on admission:  Tachycardia and leukocytosis   · Suspected source is pyelonephritis as evidence by UA and CT with hydronephrosis   · Continue cefepime due to recent instrumentation   · Blood cultures x2 after 24 hours  · Urine culture pending  · No severe sepsis criteria met  · Leukocytosis downtrending

## 2022-10-14 NOTE — PROGRESS NOTES
Progress Note - Urology   Sami Posey 78 y o  male MRN: [id number]  Unit/Bed#: -01 Encounter: [id number]      ASSESSMENT:  81-year-old male who presented with sepsis secondary to pyelonephritis and bilateral hydronephrosis  1  Pyelonephritis with bilateral hydroureteronephrosis  2  Urinary tract infection  -likely in the setting of recent instrumentation to catheter insertion  - UA grossly positive for infection  -Urine culture positive for E  Coli  - CT scan abdomen pelvis on 10/12/2022: With moderate to severe right hydroureter nephrosis (increased from prior CT scan on 10/06/2022), mild left hydronephrosis, bladder wall thickening suspicious for acute cystitis,   - RUQ (obtained to evaluate transaminitis) showed right mild hydronephrosis  Cholelithiasis but no evidence of acute cholecystitis  - day 2 of IV cefepime  -White count decreasing 15 from 19  - Febrile last night, received Tylenol and has been afebrile since 9pm   -No MODE on admission, Cr stable, decreasing 1 16  -Urine output:  2 5 L  -Continue current plan for repeat renal ultrasound tomorrow, 10/15 to re-evaluate for any progression or improvement of b/l hydronephrosis     3  Urinary retention  4  BPH  - patient presented to the ED on 10/06 with urinary retention and a Mendoza was placed  - outpatient cystoscopy performed on 10/07  Reveals signs of longstanding bladder outlet obstruction    Patient was unable to avoid in the office so a catheter was placed  - CTAP 10/12 revealed enlarged prostate, suggestive of bladder outlet obstruction  - manual irrigation Q 4 hrs  - continue Flomax  - continue bladder decompression with indwelling Mendoza catheter  - I/O, monitor urine output  - keep Mendoza upon discharge for voiding trial        SUBJECTIVE:  Reports some discomfort the urethral meatus since insertion of Mendoza catheter, this has been ongoing and chronic and is more irritating upon his daily work which involves a lot of sitting, changing positions, walking  Upon questioning about fevers he reports that last night they did required an elevated temperature and he did feel very sweaty, had to change his sheets due to diaphoresis  Denies nausea/vomiting  Tolerating regular diet  Having bowel movements  Mendoza in place intact, draining appropriately  Per nursing Mendoza catheter was flushed and there was a minimal amount of hematuria with no clots and no visible dago hematuria  OBJECTIVE:   Vitals:  Blood pressure 129/62, pulse 69, temperature 98 7 °F (37 1 °C), temperature source Oral, resp  rate 16, height 5' 9" (1 753 m), weight 90 1 kg (198 lb 9 6 oz), SpO2 94 %  ,Body mass index is 29 33 kg/m²  I/Os:    Intake/Output Summary (Last 24 hours) at 10/14/2022 1001  Last data filed at 10/14/2022 0801  Gross per 24 hour   Intake 1320 ml   Output 3600 ml   Net -2280 ml       Lines/Drains:  Invasive Devices  Report    Peripheral Intravenous Line  Duration           Peripheral IV 10/12/22 Left Antecubital 1 day          Drain  Duration           Urethral Catheter Non-latex 16 Fr  8 days                Physical Exam  Vitals reviewed  Constitutional:       General: He is not in acute distress  Appearance: He is not toxic-appearing  Comments: Seen ambulating around the room, spouse present during evaluation   HENT:      Head: Normocephalic and atraumatic  Cardiovascular:      Rate and Rhythm: Normal rate and regular rhythm  Pulmonary:      Effort: Pulmonary effort is normal       Breath sounds: Normal breath sounds  Abdominal:      Palpations: Abdomen is soft  Tenderness: There is no abdominal tenderness  There is no right CVA tenderness, left CVA tenderness, guarding or rebound  Comments: No low back tenderness  No suprapubic tenderness   Genitourinary:     Comments: Fully patent, intact Mendoza bag to gravity draining clear yellow urine, no evidence of hematuria or clots      Mild erythema urethral meatus    Nontender to palpation, no warmth, fluctuance, induration  Small amount of mucoid/seropurulent discharge at the meatus and on the Mendoza catheter  Normal appearing scrotum  Musculoskeletal:         General: No tenderness  Right lower leg: No edema  Left lower leg: No edema  Neurological:      Mental Status: He is alert  Psychiatric:      Comments: Pleasant mood, in good spirits           Diagnostics:  I have personally reviewed pertinent lab results  US right upper quadrant    Result Date: 10/13/2022  Impression: Cholelithiasis  No sonographic evidence for acute cholecystitis  Mild hydronephrosis  Workstation performed: [id number]     CT abdomen pelvis with contrast    Result Date: 10/12/2022  Impression: 1  Moderate to severe right hydroureteronephrosis which appears increased since prior ultrasound  Mild left hydronephrosis and parapelvic cysts  2   Irregular thickening of the urinary bladder wall, correlate clinically for cystitis  3   Enlarged prostate, correlate for bladder outlet obstruction  4   Cholelithiasis without evidence of cholecystitis   Workstation performed: [id number]     Recent Results (from the past 36 hour(s))   Comprehensive metabolic panel    Collection Time: 10/13/22  4:50 AM   Result Value Ref Range    Sodium 136 135 - 147 mmol/L    Potassium 4 2 3 5 - 5 3 mmol/L    Chloride 105 96 - 108 mmol/L    CO2 25 21 - 32 mmol/L    ANION GAP 6 4 - 13 mmol/L    BUN 14 5 - 25 mg/dL    Creatinine 1 16 0 60 - 1 30 mg/dL    Glucose 119 65 - 140 mg/dL    Calcium 9 2 8 3 - 10 1 mg/dL    Corrected Calcium 10 1 8 3 - 10 1 mg/dL     (H) 5 - 45 U/L     (H) 12 - 78 U/L    Alkaline Phosphatase 154 (H) 46 - 116 U/L    Total Protein 6 8 6 4 - 8 4 g/dL    Albumin 2 9 (L) 3 5 - 5 0 g/dL    Total Bilirubin 1 70 (H) 0 20 - 1 00 mg/dL    eGFR 65 ml/min/1 73sq m   Magnesium    Collection Time: 10/13/22  4:50 AM   Result Value Ref Range    Magnesium 1 8 1 6 - 2 6 mg/dL   Phosphorus    Collection Time: 10/13/22  4:50 AM   Result Value Ref Range    Phosphorus 2 5 2 3 - 4 1 mg/dL   CBC (With Platelets)    Collection Time: 10/13/22  4:50 AM   Result Value Ref Range    WBC 19 61 (H) 4 31 - 10 16 Thousand/uL    RBC 4 67 3 88 - 5 62 Million/uL    Hemoglobin 13 6 12 0 - 17 0 g/dL    Hematocrit 40 9 36 5 - 49 3 %    MCV 88 82 - 98 fL    MCH 29 1 26 8 - 34 3 pg    MCHC 33 3 31 4 - 37 4 g/dL    RDW 14 4 11 6 - 15 1 %    Platelets 799 065 - 053 Thousands/uL    MPV 9 3 8 9 - 12 7 fL   Procalcitonin    Collection Time: 10/13/22  4:50 AM   Result Value Ref Range    Procalcitonin 0 49 (H) <=0 25 ng/ml   CBC and differential    Collection Time: 10/14/22  3:47 AM   Result Value Ref Range    WBC 15 64 (H) 4 31 - 10 16 Thousand/uL    RBC 4 71 3 88 - 5 62 Million/uL    Hemoglobin 13 8 12 0 - 17 0 g/dL    Hematocrit 42 3 36 5 - 49 3 %    MCV 90 82 - 98 fL    MCH 29 3 26 8 - 34 3 pg    MCHC 32 6 31 4 - 37 4 g/dL    RDW 14 6 11 6 - 15 1 %    MPV 9 5 8 9 - 12 7 fL    Platelets 023 071 - 353 Thousands/uL    nRBC 0 /100 WBCs    Neutrophils Relative 82 (H) 43 - 75 %    Immat GRANS % 1 0 - 2 %    Lymphocytes Relative 10 (L) 14 - 44 %    Monocytes Relative 7 4 - 12 %    Eosinophils Relative 0 0 - 6 %    Basophils Relative 0 0 - 1 %    Neutrophils Absolute 12 89 (H) 1 85 - 7 62 Thousands/µL    Immature Grans Absolute 0 10 0 00 - 0 20 Thousand/uL    Lymphocytes Absolute 1 57 0 60 - 4 47 Thousands/µL    Monocytes Absolute 1 01 0 17 - 1 22 Thousand/µL    Eosinophils Absolute 0 03 0 00 - 0 61 Thousand/µL    Basophils Absolute 0 04 0 00 - 0 10 Thousands/µL   Comprehensive metabolic panel    Collection Time: 10/14/22  3:47 AM   Result Value Ref Range    Sodium 137 135 - 147 mmol/L    Potassium 4 3 3 5 - 5 3 mmol/L    Chloride 106 96 - 108 mmol/L    CO2 24 21 - 32 mmol/L    ANION GAP 7 4 - 13 mmol/L    BUN 12 5 - 25 mg/dL    Creatinine 1 16 0 60 - 1 30 mg/dL    Glucose 106 65 - 140 mg/dL    Calcium 9 2 8 3 - 10 1 mg/dL    Corrected Calcium 10 3 (H) 8 3 - 10 1 mg/dL    AST 69 (H) 5 - 45 U/L     (H) 12 - 78 U/L    Alkaline Phosphatase 172 (H) 46 - 116 U/L    Total Protein 6 9 6 4 - 8 4 g/dL    Albumin 2 6 (L) 3 5 - 5 0 g/dL    Total Bilirubin 1 20 (H) 0 20 - 1 00 mg/dL    eGFR 65 ml/min/1 73sq m       Current Medications:  Scheduled Meds:  Current Facility-Administered Medications   Medication Dose Route Frequency Provider Last Rate   • acetaminophen  650 mg Oral Q6H PRN Capri Baptiste MD     • calcium carbonate  1,000 mg Oral Daily PRN Symone Strange PA-C     • cefepime  2,000 mg Intravenous Q8H REBECA Cruz-C 2,000 mg (10/14/22 0340)   • enoxaparin  40 mg Subcutaneous Daily Symone Strange PA-C     • ondansetron  4 mg Intravenous Q6H PRN Symone Strange PA-C     • senna  1 tablet Oral HS PRN Symone Strange PA-C     • sodium chloride  125 mL/hr Intravenous Continuous Symone Strange PA-C 125 mL/hr (10/14/22 0340)   • tamsulosin  0 4 mg Oral Daily With Dinner Symone Strange PA-C       Continuous Infusions:sodium chloride, 125 mL/hr, Last Rate: 125 mL/hr (10/14/22 0340)      PRN Meds:  •  acetaminophen  •  calcium carbonate  •  ondansetron  •  senna      Lisa Kennedy  10/14/2022  10:01 AM

## 2022-10-14 NOTE — ASSESSMENT & PLAN NOTE
· CT with cholelithiasis but no signs of cholecystitis  · Labs reveal new transaminitis from 1 week ago at ,  - concern for obstructive bilirubin pattern with T bili at 1 409 alk-phos at 163  · May be related to infection  · Transaminitis improving  · Patient denies any right upper quadrant abdominal pain or vomiting, he does admit to some nausea with fever yesterday  · Right upper quadrant ultrasound with cholelithiasis no evidence of acute cholecystitis    Mild hydronephrosis

## 2022-10-14 NOTE — ASSESSMENT & PLAN NOTE
· Seen on CT a/P: "Moderate to severe right hydroureteronephrosis which appears increased since prior ultrasound    Mild left hydronephrosis and parapelvic cysts "  · Urology okay with admission at [address]  · Mendoza draining and flushing well  · Formal urology consult - anticipate repeat US kidney and bladder which is ordered for tomorrow 10/15  · Of note should patient have persistent fevers despite Mendoza catheter draining may need to consider CT abdomen/pelvis sooner

## 2022-10-14 NOTE — PLAN OF CARE
Problem: PAIN - ADULT  Goal: Verbalizes/displays adequate comfort level or baseline comfort level  Description: Interventions:  - Encourage patient to monitor pain and request assistance  - Assess pain using appropriate pain scale  - Administer analgesics based on type and severity of pain and evaluate response  - Implement non-pharmacological measures as appropriate and evaluate response  - Consider cultural and social influences on pain and pain management  - Notify physician/advanced practitioner if interventions unsuccessful or patient reports new pain  Outcome: Progressing     Problem: INFECTION - ADULT  Goal: Absence or prevention of progression during hospitalization  Description: INTERVENTIONS:  - Assess and monitor for signs and symptoms of infection  - Monitor lab/diagnostic results  - Monitor all insertion sites, i e  indwelling lines, tubes, and drains  - Monitor endotracheal if appropriate and nasal secretions for changes in amount and color  - [place] appropriate cooling/warming therapies per order  - Administer medications as ordered  - Instruct and encourage patient and family to use good hand hygiene technique  - Identify and instruct in appropriate isolation precautions for identified infection/condition  Outcome: Progressing     Problem: SAFETY ADULT  Goal: Patient will remain free of falls  Description: INTERVENTIONS:  - Educate patient/family on patient safety including physical limitations  - Instruct patient to call for assistance with activity   - Consult OT/PT to assist with strengthening/mobility   - Keep Call bell within reach  - Keep bed low and locked with side rails adjusted as appropriate  - Keep care items and personal belongings within reach  - Initiate and maintain comfort rounds  - Make Fall Risk Sign visible to staff  - Offer Toileting every 2 Hours, in advance of need  - Obtain necessary fall risk management equipment: nonskid footwear  - Apply yellow socks and bracelet for high fall risk patients  - Consider moving patient to room near nurses station  Outcome: Progressing     Problem: DISCHARGE PLANNING  Goal: Discharge to home or other facility with appropriate resources  Description: INTERVENTIONS:  - Identify barriers to discharge w/patient and caregiver  - Arrange for needed discharge resources and transportation as appropriate  - Identify discharge learning needs (meds, wound care, etc )  - Arrange for interpretive services to assist at discharge as needed  - Refer to Case Management Department for coordinating discharge planning if the patient needs post-hospital services based on physician/advanced practitioner order or complex needs related to functional status, cognitive ability, or social support system  Outcome: Progressing     Problem: Knowledge Deficit  Goal: Patient/family/caregiver demonstrates understanding of disease process, treatment plan, medications, and discharge instructions  Description: Complete learning assessment and assess knowledge base    Interventions:  - Provide teaching at level of understanding  - Provide teaching via preferred learning methods  Outcome: Progressing     Problem: GENITOURINARY - ADULT  Goal: Maintains or returns to baseline urinary function  Description: INTERVENTIONS:  - Assess urinary function  - Encourage oral fluids to ensure adequate hydration if ordered  - Administer IV fluids as ordered to ensure adequate hydration  - Administer ordered medications as needed  - Offer frequent toileting  - Follow urinary retention protocol if ordered  Outcome: Progressing  Goal: Absence of urinary retention  Description: INTERVENTIONS:  - Assess patient’s ability to void and empty bladder  - Monitor I/O  - Bladder scan as needed  - Discuss with physician/AP medications to alleviate retention as needed  - Discuss catheterization for long term situations as appropriate  Outcome: Progressing  Goal: Urinary catheter remains patent  Description: INTERVENTIONS:  - Assess patency of urinary catheter  - If patient has a chronic campbell, consider changing catheter if non-functioning  - Follow guidelines for intermittent irrigation of non-functioning urinary catheter  Outcome: Progressing     Problem: METABOLIC, FLUID AND ELECTROLYTES - ADULT  Goal: Electrolytes maintained within normal limits  Description: INTERVENTIONS:  - Monitor labs and assess patient for signs and symptoms of electrolyte imbalances  - Administer electrolyte replacement as ordered  - Monitor response to electrolyte replacements, including repeat lab results as appropriate  - Instruct patient on fluid and nutrition as appropriate  Outcome: Progressing  Goal: Fluid balance maintained  Description: INTERVENTIONS:  - Monitor labs   - Monitor I/O and WT  - Instruct patient on fluid and nutrition as appropriate  - Assess for signs & symptoms of volume excess or deficit  Outcome: Progressing     Problem: GASTROINTESTINAL - ADULT  Goal: Minimal or absence of nausea and/or vomiting  Description: INTERVENTIONS:  - Administer IV fluids if ordered to ensure adequate hydration  - Maintain NPO status until nausea and vomiting are resolved  - Nasogastric tube if ordered  - Administer ordered antiemetic medications as needed  - Provide nonpharmacologic comfort measures as appropriate  - Advance diet as tolerated, if ordered  - Consider nutrition services referral to assist patient with adequate nutrition and appropriate food choices  Outcome: Progressing  Goal: Maintains or returns to baseline bowel function  Description: INTERVENTIONS:  - Assess bowel function  - Encourage oral fluids to ensure adequate hydration  - Administer IV fluids if ordered to ensure adequate hydration  - Administer ordered medications as needed  - Encourage mobilization and activity  - Consider nutritional services referral to assist patient with adequate nutrition and appropriate food choices  Outcome: Progressing  Goal: Maintains adequate nutritional intake  Description: INTERVENTIONS:  - Monitor percentage of each meal consumed  - Identify factors contributing to decreased intake, treat as appropriate  - Assist with meals as needed  - Monitor I&O, weight, and lab values if indicated  - Obtain nutrition services referral as needed  Outcome: Progressing     Problem: SKIN/TISSUE INTEGRITY - ADULT  Goal: Skin Integrity remains intact(Skin Breakdown Prevention)  Description: Assess:  -Perform Willie assessment every x  -Clean and moisturize skin every x  -Inspect skin when repositioning, toileting, and assisting with ADLS  -Assess under medical devices such as x every x  -Assess extremities for adequate circulation and sensation     Bed Management:  -Have minimal linens on bed & keep smooth, unwrinkled  -Change linens as needed when moist or perspiring  -Avoid sitting or lying in one position for more than x hours while in bed  -Keep HOB at xxxxxxdegrees     Toileting:  -Offer bedside commode  -Assess for incontinence every x  -Use incontinent care products after each incontinent episode such as x    Activity:  -Mobilize patient x times a day  -Encourage activity and walks on unit  -Encourage or provide ROM exercises   -Turn and reposition patient every x Hours  -Use appropriate equipment to lift or move patient in bed  -Instruct/ Assist with weight shifting every x when out of bed in chair  -Consider limitation of chair time x hour intervals    Skin Care:  -Avoid use of baby powder, tape, friction and shearing, hot water or constrictive clothing  -Relieve pressure over bony prominences using x  -Do not massage red bony areas    Next Steps:  -Teach patient strategies to minimize risks such as x   -Consider consults to  interdisciplinary teams such as x  Outcome: Progressing     Problem: HEMATOLOGIC - ADULT  Goal: Maintains hematologic stability  Description: INTERVENTIONS  - Assess for signs and symptoms of bleeding or hemorrhage  - Monitor labs  - Administer supportive blood products/factors as ordered and appropriate  Outcome: Progressing     Problem: MUSCULOSKELETAL - ADULT  Goal: Maintain or return mobility to safest level of function  Description: INTERVENTIONS:  - Assess patient's ability to carry out ADLs; assess patient's baseline for ADL function and identify physical deficits which impact ability to perform ADLs (bathing, care of mouth/teeth, toileting, grooming, dressing, etc )  - Assess/evaluate cause of self-care deficits   - Assess range of motion  - Assess patient's mobility  - Assess patient's need for assistive devices and provide as appropriate  - Encourage maximum independence but intervene and supervise when necessary  - Involve family in performance of ADLs  - Assess for home care needs following discharge   - Consider OT consult to assist with ADL evaluation and planning for discharge  - Provide patient education as appropriate  Outcome: Progressing     Problem: Nutrition/Hydration-ADULT  Goal: Nutrient/Hydration intake appropriate for improving, restoring or maintaining nutritional needs  Description: Monitor and assess patient's nutrition/hydration status for malnutrition  Collaborate with interdisciplinary team and initiate plan and interventions as ordered  Monitor patient's weight and dietary intake as ordered or per policy  Utilize nutrition screening tool and intervene as necessary  Determine patient's food preferences and provide high-protein, high-caloric foods as appropriate       INTERVENTIONS:  - Monitor oral intake, urinary output, labs, and treatment plans  - Assess nutrition and hydration status and recommend course of action  - Evaluate amount of meals eaten  - Assist patient with eating if necessary   - Allow adequate time for meals  - Recommend/ encourage appropriate diets, oral nutritional supplements, and vitamin/mineral supplements  - Order, calculate, and assess calorie counts as needed  - Recommend, monitor, and adjust tube feedings and TPN/PPN based on assessed needs  - Assess need for intravenous fluids  - Provide specific nutrition/hydration education as appropriate  - Include patient/family/caregiver in decisions related to nutrition  Outcome: Progressing     Problem: Potential for Falls  Goal: Patient will remain free of falls  Description: INTERVENTIONS:  - Educate patient/family on patient safety including physical limitations  - Instruct patient to call for assistance with activity   - Consult OT/PT to assist with strengthening/mobility   - Keep Call bell within reach  - Keep bed low and locked with side rails adjusted as appropriate  - Keep care items and personal belongings within reach  - Initiate and maintain comfort rounds  - Make Fall Risk Sign visible to staff  - Offer Toileting every 2 Hours, in advance of need  - Obtain necessary fall risk management equipment: nonskid footwear  - Apply yellow socks and bracelet for high fall risk patients  - Consider moving patient to room near nurses station  Outcome: Progressing     Problem: Prexisting or High Potential for Compromised Skin Integrity  Goal: Skin integrity is maintained or improved  Description: INTERVENTIONS:  - Identify patients at risk for skin breakdown  - Assess and monitor skin integrity  - Assess and monitor nutrition and hydration status  - Monitor labs   - Assess for incontinence   - Turn and reposition patient  - Assist with mobility/ambulation  - Relieve pressure over bony prominences  - Avoid friction and shearing  - Provide appropriate hygiene as needed including keeping skin clean and dry  - Evaluate need for skin moisturizer/barrier cream  - Collaborate with interdisciplinary team   - Patient/family teaching  - Consider wound care consult   Outcome: Progressing

## 2022-10-14 NOTE — PROGRESS NOTES
[place]     Progress Note Christelle Kbeede 1955, 78 y o  male MRN: [id number]  Unit/Bed#: -01 Encounter: [id number]  Primary Care Provider: No primary care provider on file  Date and time admitted to hospital: 10/12/2022  7:49 PM    * Pyelonephritis  Assessment & Plan  · Sudden onset chills and fever with T-max of 103 4° F 10/12 afternoon   · Mendoza catheter present x1 week   · UA with innumerable bacteria and wbc's  · ED spoke with Urology, who felt patient could stay at this hospital to receive IV antibiotics   · Currently on IV cefepime  · Prelim urine culture growing Gram-negative rods likely E coli  · Blood cultures negative x2 after 24 hours    Hydroureteronephrosis, right  Assessment & Plan  · Seen on CT a/P: "Moderate to severe right hydroureteronephrosis which appears increased since prior ultrasound    Mild left hydronephrosis and parapelvic cysts "  · Urology okay with admission at [facility]  · Mendoza draining and flushing well  · Formal urology consult - anticipate repeat US kidney and bladder which is ordered for tomorrow 10/15  · Of note should patient have persistent fevers despite Mendoza catheter draining may need to consider CT abdomen/pelvis sooner    Sepsis without acute organ dysfunction ([facility] [address] )  Assessment & Plan  · Sirs criteria met on admission:  Tachycardia and leukocytosis   · Suspected source is pyelonephritis as evidence by UA and CT with hydronephrosis   · Continue cefepime due to recent instrumentation   · Blood cultures x2 after 24 hours  · Urine culture pending  · No severe sepsis criteria met  · Leukocytosis downtrending    Serum total bilirubin elevated  Assessment & Plan  · Total bilirubin elevated at 1 40 with alk-phos at 163   · CT with cholelithiasis, right upper quadrant without evidence of cholecystitis    Transaminitis  Assessment & Plan  · ,   · Right upper quadrant ultrasound unremarkable  · LFTs down trending  · May be related to infection    Cholelithiasis  Assessment & Plan  · CT with cholelithiasis but no signs of cholecystitis  · Labs reveal new transaminitis from 1 week ago at ,  - concern for obstructive bilirubin pattern with T bili at 1 409 alk-phos at 163  · May be related to infection  · Transaminitis improving  · Patient denies any right upper quadrant abdominal pain or vomiting, he does admit to some nausea with fever yesterday  · Right upper quadrant ultrasound with cholelithiasis no evidence of acute cholecystitis  Mild hydronephrosis    Urinary retention  Assessment & Plan  · Mendoza catheter placed in ED on 10/06   · Followed up with Urology on 10/07 with Mendoza replaced due to ongoing urinary retention  · 10/7 Cystoscopy with signs of longstanding bladder outlet obstruction with relatively small size of prostate minimal signs of bowel obstruction  · Mendoza catheter Q 4 hour hand irrigation  · Urology consult  · Continue Flomax    VTE Pharmacologic Prophylaxis: VTE Score: 5 High Risk (Score >/= 5) - Pharmacological DVT Prophylaxis Ordered: enoxaparin (Lovenox)  Sequential Compression Devices Ordered  Patient Centered Rounds: I performed bedside rounds with nursing staff today  Discussions with Specialists or Other Care Team Provider: CM, urology AP    Education and Discussions with Family / Patient: Patient declined call to   Reports his wife will be coming to the hospital this afternoon and requesting urology update  TT sent to urology AP  Encouraged patient to reach out in the meantime regarding any further questions  Time Spent for Care: 30 minutes  More than 50% of total time spent on counseling and coordination of care as described above      Current Length of Stay: 2 day(s)  Current Patient Status: Inpatient   Certification Statement: The patient will continue to require additional inpatient hospital stay due to Pyelonephritis  Discharge Plan: Anticipate discharge in 24-48 hrs to home  Code Status: Level 1 - Full Code    Subjective:   Patient overall feels better since admission  Denies chest pain/palpitations, shortness of breath, nausea vomiting, abdominal pain  Objective:     Vitals:   Temp (24hrs), Av 2 °F (37 3 °C), Min:98 1 °F (36 7 °C), Max:100 6 °F (38 1 °C)    Temp:  [98 1 °F (36 7 °C)-100 6 °F (38 1 °C)] 98 7 °F (37 1 °C)  HR:  [59-72] 69  Resp:  [14-18] 16  BP: ()/(53-72) 129/62  SpO2:  [93 %-96 %] 94 %  Body mass index is 29 33 kg/m²  Input and Output Summary (last 24 hours): Intake/Output Summary (Last 24 hours) at 10/14/2022 1222  Last data filed at 10/14/2022 1030  Gross per 24 hour   Intake 840 ml   Output 4495 ml   Net -3655 ml       Physical Exam:   Physical Exam  Vitals and nursing note reviewed  Constitutional:       Appearance: He is well-developed  Comments: No acute distress   HENT:      Head: Normocephalic and atraumatic  Eyes:      General: No scleral icterus  Extraocular Movements: Extraocular movements intact  Conjunctiva/sclera: Conjunctivae normal    Cardiovascular:      Rate and Rhythm: Normal rate and regular rhythm  Heart sounds: S1 normal and S2 normal  No murmur heard  Pulmonary:      Effort: Pulmonary effort is normal  No respiratory distress  Breath sounds: Normal breath sounds  No wheezing, rhonchi or rales  Abdominal:      General: Bowel sounds are normal       Palpations: Abdomen is soft  Tenderness: There is no abdominal tenderness  There is no guarding or rebound  Musculoskeletal:      Cervical back: Normal range of motion  Comments: Able to move upper/lower extremities bilaterally, no edema   Skin:     General: Skin is warm and dry  Neurological:      Mental Status: He is alert and oriented to person, place, and time     Psychiatric:         Mood and Affect: Mood normal          Speech: Speech normal          Behavior: Behavior normal           Additional Data: Labs:  Results from last 7 days   Lab Units 10/14/22  0347   WBC Thousand/uL 15 64*   HEMOGLOBIN g/dL 13 8   HEMATOCRIT % 42 3   PLATELETS Thousands/uL 195   NEUTROS PCT % 82*   LYMPHS PCT % 10*   MONOS PCT % 7   EOS PCT % 0     Results from last 7 days   Lab Units 10/14/22  0347   SODIUM mmol/L 137   POTASSIUM mmol/L 4 3   CHLORIDE mmol/L 106   CO2 mmol/L 24   BUN mg/dL 12   CREATININE mg/dL 1 16   ANION GAP mmol/L 7   CALCIUM mg/dL 9 2   ALBUMIN g/dL 2 6*   TOTAL BILIRUBIN mg/dL 1 20*   ALK PHOS U/L 172*   ALT U/L 188*   AST U/L 69*   GLUCOSE RANDOM mg/dL 106     Results from last 7 days   Lab Units 10/12/22  2006   INR  0 98             Results from last 7 days   Lab Units 10/13/22  0450 10/12/22  2006   LACTIC ACID mmol/L  --  1 0   PROCALCITONIN ng/ml 0 49* 0 40*       Lines/Drains:  Invasive Devices  Report    Peripheral Intravenous Line  Duration           Peripheral IV 10/12/22 Left Antecubital 1 day          Drain  Duration           Urethral Catheter Non-latex 16 Fr  8 days              Urinary Catheter:  Goal for removal: N/A- Discharging with Mendoza               Imaging: Reviewed radiology reports from this admission including: abdominal/pelvic CT and ultrasound(s)    Recent Cultures (last 7 days):   Results from last 7 days   Lab Units 10/12/22  2006 10/12/22  1839   BLOOD CULTURE  No Growth at 24 hrs    No Growth at 24 hrs   --    URINE CULTURE   --  >100,000 cfu/ml Escherichia coli*  6575-9861 cfu/ml Staphylococcus coagulase negative*       Last 24 Hours Medication List:   Current Facility-Administered Medications   Medication Dose Route Frequency Provider Last Rate   • acetaminophen  650 mg Oral Q6H PRN Giuliano Llamas MD     • calcium carbonate  1,000 mg Oral Daily PRN Chloe Cordova PA-C     • cefepime  2,000 mg Intravenous Q8H Chloe Cordova PA-C 2,000 mg (10/14/22 0340)   • enoxaparin  40 mg Subcutaneous Daily Chloe Cordova PA-C     • ondansetron  4 mg Intravenous Q6H PRN Kwabena Gonzalez PA-C     • senna  1 tablet Oral HS PRN Kwabena Gonzalez PA-C     • sodium chloride  125 mL/hr Intravenous Continuous Kwabena Gonzalez PA-C 125 mL/hr (10/14/22 1146)   • tamsulosin  0 4 mg Oral Daily With [facility]          Today, Patient Was Seen By: Winston Arriola    **Please Note: This note may have been constructed using a voice recognition system  **

## 2022-10-14 NOTE — ASSESSMENT & PLAN NOTE
· Total bilirubin elevated at 1 40 with alk-phos at 163   · CT with cholelithiasis, right upper quadrant without evidence of cholecystitis

## 2022-10-14 NOTE — PLAN OF CARE
Problem: PAIN - ADULT  Goal: Verbalizes/displays adequate comfort level or baseline comfort level  Description: Interventions:  - Encourage patient to monitor pain and request assistance  - Assess pain using appropriate pain scale  - Administer analgesics based on type and severity of pain and evaluate response  - Implement non-pharmacological measures as appropriate and evaluate response  - Consider cultural and social influences on pain and pain management  - Notify physician/advanced practitioner if interventions unsuccessful or patient reports new pain  Outcome: Progressing     Problem: INFECTION - ADULT  Goal: Absence or prevention of progression during hospitalization  Description: INTERVENTIONS:  - Assess and monitor for signs and symptoms of infection  - Monitor lab/diagnostic results  - Monitor all insertion sites, i e  indwelling lines, tubes, and drains  - Monitor endotracheal if appropriate and nasal secretions for changes in amount and color  - [place] appropriate cooling/warming therapies per order  - Administer medications as ordered  - Instruct and encourage patient and family to use good hand hygiene technique  - Identify and instruct in appropriate isolation precautions for identified infection/condition  Outcome: Progressing     Problem: SAFETY ADULT  Goal: Patient will remain free of falls  Description: INTERVENTIONS:  - Educate patient/family on patient safety including physical limitations  - Instruct patient to call for assistance with activity   - Consult OT/PT to assist with strengthening/mobility   - Keep Call bell within reach  - Keep bed low and locked with side rails adjusted as appropriate  - Keep care items and personal belongings within reach  - Initiate and maintain comfort rounds  - Make Fall Risk Sign visible to staff  - Offer Toileting every 2 Hours, in advance of need  - Obtain necessary fall risk management equipment: nonskid footwear  - Apply yellow socks and bracelet for high fall risk patients  - Consider moving patient to room near nurses station  Outcome: Progressing     Problem: DISCHARGE PLANNING  Goal: Discharge to home or other facility with appropriate resources  Description: INTERVENTIONS:  - Identify barriers to discharge w/patient and caregiver  - Arrange for needed discharge resources and transportation as appropriate  - Identify discharge learning needs (meds, wound care, etc )  - Arrange for interpretive services to assist at discharge as needed  - Refer to Case Management Department for coordinating discharge planning if the patient needs post-hospital services based on physician/advanced practitioner order or complex needs related to functional status, cognitive ability, or social support system  Outcome: Progressing     Problem: Knowledge Deficit  Goal: Patient/family/caregiver demonstrates understanding of disease process, treatment plan, medications, and discharge instructions  Description: Complete learning assessment and assess knowledge base    Interventions:  - Provide teaching at level of understanding  - Provide teaching via preferred learning methods  Outcome: Progressing     Problem: GENITOURINARY - ADULT  Goal: Maintains or returns to baseline urinary function  Description: INTERVENTIONS:  - Assess urinary function  - Encourage oral fluids to ensure adequate hydration if ordered  - Administer IV fluids as ordered to ensure adequate hydration  - Administer ordered medications as needed  - Offer frequent toileting  - Follow urinary retention protocol if ordered  Outcome: Progressing  Goal: Absence of urinary retention  Description: INTERVENTIONS:  - Assess patient’s ability to void and empty bladder  - Monitor I/O  - Bladder scan as needed  - Discuss with physician/AP medications to alleviate retention as needed  - Discuss catheterization for long term situations as appropriate  Outcome: Progressing  Goal: Urinary catheter remains patent  Description: INTERVENTIONS:  - Assess patency of urinary catheter  - If patient has a chronic campbell, consider changing catheter if non-functioning  - Follow guidelines for intermittent irrigation of non-functioning urinary catheter  Outcome: Progressing     Problem: METABOLIC, FLUID AND ELECTROLYTES - ADULT  Goal: Electrolytes maintained within normal limits  Description: INTERVENTIONS:  - Monitor labs and assess patient for signs and symptoms of electrolyte imbalances  - Administer electrolyte replacement as ordered  - Monitor response to electrolyte replacements, including repeat lab results as appropriate  - Instruct patient on fluid and nutrition as appropriate  Outcome: Progressing

## 2022-10-14 NOTE — ASSESSMENT & PLAN NOTE
· ,   · Right upper quadrant ultrasound unremarkable  · LFTs down trending  · May be related to infection

## 2022-10-15 ENCOUNTER — APPOINTMENT (OUTPATIENT)
Dept: ULTRASOUND IMAGING | Facility: HOSPITAL | Age: 67
DRG: 698 | End: 2022-10-15
Payer: MEDICARE

## 2022-10-15 PROBLEM — R74.01 TRANSAMINITIS: Status: RESOLVED | Noted: 2022-10-13 | Resolved: 2022-10-15

## 2022-10-15 PROBLEM — R17 SERUM TOTAL BILIRUBIN ELEVATED: Status: RESOLVED | Noted: 2022-10-13 | Resolved: 2022-10-15

## 2022-10-15 LAB
ALBUMIN SERPL BCP-MCNC: 2.6 G/DL (ref 3.5–5)
ALP SERPL-CCNC: 197 U/L (ref 46–116)
ALT SERPL W P-5'-P-CCNC: 130 U/L (ref 12–78)
ANION GAP SERPL CALCULATED.3IONS-SCNC: 9 MMOL/L (ref 4–13)
AST SERPL W P-5'-P-CCNC: 37 U/L (ref 5–45)
BACTERIA UR CULT: ABNORMAL
BACTERIA UR CULT: ABNORMAL
BASOPHILS # BLD AUTO: 0.05 THOUSANDS/ΜL (ref 0–0.1)
BASOPHILS NFR BLD AUTO: 1 % (ref 0–1)
BILIRUB DIRECT SERPL-MCNC: 0.12 MG/DL (ref 0–0.2)
BILIRUB SERPL-MCNC: 0.4 MG/DL (ref 0.2–1)
BUN SERPL-MCNC: 12 MG/DL (ref 5–25)
CALCIUM SERPL-MCNC: 9.3 MG/DL (ref 8.3–10.1)
CHLORIDE SERPL-SCNC: 106 MMOL/L (ref 96–108)
CO2 SERPL-SCNC: 23 MMOL/L (ref 21–32)
CREAT SERPL-MCNC: 1.1 MG/DL (ref 0.6–1.3)
EOSINOPHIL # BLD AUTO: 0.07 THOUSAND/ΜL (ref 0–0.61)
EOSINOPHIL NFR BLD AUTO: 1 % (ref 0–6)
ERYTHROCYTE [DISTWIDTH] IN BLOOD BY AUTOMATED COUNT: 14.6 % (ref 11.6–15.1)
GFR SERPL CREATININE-BSD FRML MDRD: 69 ML/MIN/1.73SQ M
GLUCOSE SERPL-MCNC: 151 MG/DL (ref 65–140)
HCT VFR BLD AUTO: 42.8 % (ref 36.5–49.3)
HGB BLD-MCNC: 13.9 G/DL (ref 12–17)
IMM GRANULOCYTES # BLD AUTO: 0.03 THOUSAND/UL (ref 0–0.2)
IMM GRANULOCYTES NFR BLD AUTO: 0 % (ref 0–2)
LYMPHOCYTES # BLD AUTO: 0.78 THOUSANDS/ΜL (ref 0.6–4.47)
LYMPHOCYTES NFR BLD AUTO: 10 % (ref 14–44)
MCH RBC QN AUTO: 29.3 PG (ref 26.8–34.3)
MCHC RBC AUTO-ENTMCNC: 32.5 G/DL (ref 31.4–37.4)
MCV RBC AUTO: 90 FL (ref 82–98)
MONOCYTES # BLD AUTO: 0.47 THOUSAND/ΜL (ref 0.17–1.22)
MONOCYTES NFR BLD AUTO: 6 % (ref 4–12)
NEUTROPHILS # BLD AUTO: 6.81 THOUSANDS/ΜL (ref 1.85–7.62)
NEUTS SEG NFR BLD AUTO: 82 % (ref 43–75)
NRBC BLD AUTO-RTO: 0 /100 WBCS
PLATELET # BLD AUTO: 223 THOUSANDS/UL (ref 149–390)
PMV BLD AUTO: 9.6 FL (ref 8.9–12.7)
POTASSIUM SERPL-SCNC: 3.8 MMOL/L (ref 3.5–5.3)
PROT SERPL-MCNC: 7 G/DL (ref 6.4–8.4)
RBC # BLD AUTO: 4.75 MILLION/UL (ref 3.88–5.62)
SODIUM SERPL-SCNC: 138 MMOL/L (ref 135–147)
WBC # BLD AUTO: 8.21 THOUSAND/UL (ref 4.31–10.16)

## 2022-10-15 PROCEDURE — 76775 US EXAM ABDO BACK WALL LIM: CPT

## 2022-10-15 PROCEDURE — 80076 HEPATIC FUNCTION PANEL: CPT | Performed by: PHYSICIAN ASSISTANT

## 2022-10-15 PROCEDURE — 85025 COMPLETE CBC W/AUTO DIFF WBC: CPT | Performed by: PHYSICIAN ASSISTANT

## 2022-10-15 PROCEDURE — 80048 BASIC METABOLIC PNL TOTAL CA: CPT | Performed by: PHYSICIAN ASSISTANT

## 2022-10-15 PROCEDURE — 99239 HOSP IP/OBS DSCHRG MGMT >30: CPT | Performed by: PHYSICIAN ASSISTANT

## 2022-10-15 RX ORDER — CIPROFLOXACIN 500 MG/1
500 TABLET, FILM COATED ORAL EVERY 12 HOURS SCHEDULED
Qty: 28 TABLET | Refills: 0 | Status: CANCELLED | OUTPATIENT
Start: 2022-10-15 | End: 2022-10-29

## 2022-10-15 RX ORDER — LEVOFLOXACIN 750 MG/1
750 TABLET ORAL EVERY 24 HOURS
Qty: 12 TABLET | Refills: 0 | Status: SHIPPED | OUTPATIENT
Start: 2022-10-15 | End: 2022-10-27

## 2022-10-15 RX ADMIN — ENOXAPARIN SODIUM 40 MG: 100 INJECTION SUBCUTANEOUS at 08:30

## 2022-10-15 RX ADMIN — CEFEPIME HYDROCHLORIDE 2000 MG: 2 INJECTION, SOLUTION INTRAVENOUS at 03:52

## 2022-10-15 RX ADMIN — CEFEPIME HYDROCHLORIDE 2000 MG: 2 INJECTION, SOLUTION INTRAVENOUS at 11:15

## 2022-10-15 RX ADMIN — SODIUM CHLORIDE 125 ML/HR: 0.9 INJECTION, SOLUTION INTRAVENOUS at 05:55

## 2022-10-15 NOTE — DISCHARGE SUMMARY
[place]     Discharge- Josefina Killian 1955, 78 y o  male MRN: [id number]  Unit/Bed#: -01 Encounter: [id number]  Primary Care Provider: No primary care provider on file  Date and time admitted to hospital: 10/12/2022  7:49 PM    * Pyelonephritis  Assessment & Plan  · Sudden onset chills and fever with T-max of 103 4° F 10/12 afternoon   · Mendoza catheter present x1 week   · UA with innumerable bacteria and wbc's  · ED spoke with Urology, who felt patient could stay at this hospital to receive IV antibiotics   · Initially had been started on IV cefepime  · Urine culture growing >100k E coli  · Blood cultures negative x2 after 48 hours  · Discussed with urology - medically stable for discharge  Patient remains afebrile and leukocytosis has resolved  · Discharge on oral Levaquin to complete antibiotic course  · Patient has outpatient follow-up scheduled with Urology regarding void trial    Hydroureteronephrosis, right  Assessment & Plan  · Seen on CT a/P: "Moderate to severe right hydroureteronephrosis which appears increased since prior ultrasound  Mild left hydronephrosis and parapelvic cysts "  · Mendoza draining and flushing well  · Formal urology consult - discussed kidney ultrasound and bladder from this morning  Does demonstrate persistent hydronephrosis however clinically patient continues to improve and is afebrile    Per Urology patient stable for discharge today on oral antibiotic to complete course    Sepsis without acute organ dysfunction [facility])  Assessment & Plan  · Sirs criteria met on admission:  Tachycardia and leukocytosis   · Suspected source is pyelonephritis as evidence by UA and CT with hydronephrosis   · Had been started on IV cefepime during hospitalization  · Blood cultures x2 after 48 hours  · No severe sepsis criteria met  · Leukocytosis resolved  · Urine culture growing > 100k ecoli  · Discharge on oral Levaquin to complete course    Cholelithiasis  Assessment & Plan  · CT with cholelithiasis but no signs of cholecystitis  · Labs reveal new transaminitis from 1 week ago at ,  - concern for obstructive bilirubin pattern with T bili at 1 409 alk-phos at 163  · May be related to infection  · Transaminitis continues to improve  · Patient denies any right upper quadrant abdominal pain or vomiting, he does admit to some nausea with fever yesterday  · Right upper quadrant ultrasound with cholelithiasis no evidence of acute cholecystitis  Mild hydronephrosis    Urinary retention  Assessment & Plan  · Mendoza catheter placed in ED on 10/06   · Followed up with Urology on 10/07 with Mendoza replaced due to ongoing urinary retention  · 10/7 Cystoscopy with signs of longstanding bladder outlet obstruction with relatively small size of prostate minimal signs of bowel obstruction  · Patient has outpatient follow-up with Urology scheduled for outpatient void trial    Medical Problems             Resolved Problems  Date Reviewed: 10/15/2022          Resolved    Transaminitis 10/15/2022     Resolved by  Aleshia Dyer PA-C    Serum total bilirubin elevated 10/15/2022     Resolved by  Aleshia Dyer PA-C              Discharging Physician / Practitioner: Aleshia Dyer  PCP: No primary care provider on file  Admission Date:   Admission Orders (From admission, onward)     Ordered        10/12/22 3009  1 [facility] [place]  Once                      Discharge Date: 10/15/22    Consultations During Hospital Stay:  · Urology    Procedures Performed:   · None    Significant Findings / Test Results:   · Ultrasound kidney and bladder:  Mild bilateral hydronephrosis  · CT abdomen/pelvis 10/12: Moderate to severe right hydroureteronephrosis appears increased since prior ultrasound  Mild left hydronephrosis and peripelvic cysts  Irregular thickening of the urinary bladder wall, correlate for cystitis    Enlarged prostate, correlate for bladder outlet obstruction  Cholelithiasis without evidence of cholecystitis  · Right upper quadrant ultrasound:  Cholelithiasis  No sonographic evidence for acute cholecystitis  Mild hydronephrosis  · Ultrasound kidney and bladder 10/15: Moderate to severe left-sided hydronephrosis and mild right-sided hydronephrosis, similar in appearance to CT dated 10/12  Significant urinary bladder wall thickening  Limited secondary to bladder decompressed around Mendoza catheter  · Blood cultures negative x2 after 48 hours  · Urine culture growing > 100k E coli and 1-9 K Staph coagulase negative    Incidental Findings:   · As above     Test Results Pending at Discharge (will require follow up): · None     Outpatient Tests Requested:  · None    Complications:  None    Reason for Admission:  Sepsis due to pyelonephritis    Hospital Course:   Patrice Rush is a 78 y o  male patient who originally presented to the hospital on 10/12/2022 due to fever  Past medical history significant for BPH and urinary retention  Patient had presented to the emergency department due to high-grade fevers with T-max of 103 4°  Patient had recent cystoscopy and had Mendoza catheter placed due to urinary retention  Patient met sepsis criteria on admission with tachycardia and leukocytosis  Given recent instrumentation patient was started on IV cefepime and urine culture and blood cultures were obtained  Blood cultures were negative x2 after 48 hours  Urine culture did grow greater than 100k E coli and 1-9 K Staph coagulase negative (likely contaminant)  Patient had follow-up ultrasound kidney and bladder which did redemonstrate hydronephrosis however clinically patient continued to improve and was afebrile and had resolution of leukocytosis prior to discharge  Case was discussed with Urology and given patient's stability and clinical improvement cleared for discharge home with close outpatient follow-up with Urology    Patient discharged home on oral Levaquin to complete course  Patient already has outpatient follow-up scheduled with urology for void trial   On day of discharge patient was afebrile, hemodynamically stable and verbalized understanding for requested outpatient follow-up  Please see above list of diagnoses and related plan for additional information  Condition at Discharge: stable    Discharge Day Visit / Exam:   Subjective:  Denies any complaints  Kaitlynn Sidhu for discharge home  Vitals: Blood Pressure: 141/79 (10/14/22 2100)  Pulse: 71 (10/14/22 2100)  Temperature: 99 7 °F (37 6 °C) (10/14/22 2100)  Temp Source: Oral (10/14/22 0721)  Respirations: 17 (10/14/22 1413)  Height: 5' 9" (175 3 cm) (10/13/22 0455)  Weight - Scale: 90 1 kg (198 lb 9 6 oz) (10/14/22 0338)  SpO2: 94 % (10/14/22 2100)  Exam:   Physical Exam  Vitals and nursing note reviewed  Constitutional:       Appearance: He is well-developed  Comments: No acute distress   HENT:      Head: Normocephalic and atraumatic  Eyes:      General: No scleral icterus  Extraocular Movements: Extraocular movements intact  Conjunctiva/sclera: Conjunctivae normal    Cardiovascular:      Rate and Rhythm: Normal rate and regular rhythm  Heart sounds: S1 normal and S2 normal  No murmur heard  Pulmonary:      Effort: Pulmonary effort is normal  No respiratory distress  Breath sounds: Normal breath sounds  No wheezing, rhonchi or rales  Abdominal:      Palpations: Abdomen is soft  Tenderness: There is no abdominal tenderness  Comments: Mendoza catheter draining clear, yellow urine   Musculoskeletal:      Cervical back: Normal range of motion  Comments: Able to move upper/lower extremities bilaterally, no edema   Skin:     General: Skin is warm and dry  Neurological:      Mental Status: He is alert and oriented to person, place, and time     Psychiatric:         Mood and Affect: Mood normal          Speech: Speech normal  Behavior: Behavior normal           Discussion with Family: Patient declined call to   Discharge instructions/Information to patient and family:   See after visit summary for information provided to patient and family  Provisions for Follow-Up Care:  See after visit summary for information related to follow-up care and any pertinent home health orders  Disposition:   Home    Planned Readmission:  None     Discharge Statement:  I spent 60 minutes discharging the patient  This time was spent on the day of discharge  I had direct contact with the patient on the day of discharge  Greater than 50% of the total time was spent examining patient, answering all patient questions, arranging and discussing plan of care with patient as well as directly providing post-discharge instructions  Additional time then spent on discharge activities  Discharge Medications:  See after visit summary for reconciled discharge medications provided to patient and/or family        **Please Note: This note may have been constructed using a voice recognition system**

## 2022-10-15 NOTE — NURSING NOTE
Reviewed d/c instructions, new rx scripts and f/u appts  With pt  Pt 's campbell drainage bag was switched to a leg bag  Pt  Is being transported home by his wife  Pt  Taken to lobby via WC by PCA

## 2022-10-15 NOTE — PLAN OF CARE
Problem: PAIN - ADULT  Goal: Verbalizes/displays adequate comfort level or baseline comfort level  Description: Interventions:  - Encourage patient to monitor pain and request assistance  - Assess pain using appropriate pain scale  - Administer analgesics based on type and severity of pain and evaluate response  - Implement non-pharmacological measures as appropriate and evaluate response  - Consider cultural and social influences on pain and pain management  - Notify physician/advanced practitioner if interventions unsuccessful or patient reports new pain  Outcome: Progressing     Problem: INFECTION - ADULT  Goal: Absence or prevention of progression during hospitalization  Description: INTERVENTIONS:  - Assess and monitor for signs and symptoms of infection  - Monitor lab/diagnostic results  - Monitor all insertion sites, i e  indwelling lines, tubes, and drains  - Monitor endotracheal if appropriate and nasal secretions for changes in amount and color  - [place] appropriate cooling/warming therapies per order  - Administer medications as ordered  - Instruct and encourage patient and family to use good hand hygiene technique  - Identify and instruct in appropriate isolation precautions for identified infection/condition  Outcome: Progressing     Problem: SAFETY ADULT  Goal: Patient will remain free of falls  Description: INTERVENTIONS:  - Educate patient/family on patient safety including physical limitations  - Instruct patient to call for assistance with activity   - Consult OT/PT to assist with strengthening/mobility   - Keep Call bell within reach  - Keep bed low and locked with side rails adjusted as appropriate  - Keep care items and personal belongings within reach  - Initiate and maintain comfort rounds  - Make Fall Risk Sign visible to staff  - Offer Toileting every 2 Hours, in advance of need  - Obtain necessary fall risk management equipment: nonskid footwear  - Apply yellow socks and bracelet for high fall risk patients  - Consider moving patient to room near nurses station  Outcome: Progressing     Problem: DISCHARGE PLANNING  Goal: Discharge to home or other facility with appropriate resources  Description: INTERVENTIONS:  - Identify barriers to discharge w/patient and caregiver  - Arrange for needed discharge resources and transportation as appropriate  - Identify discharge learning needs (meds, wound care, etc )  - Arrange for interpretive services to assist at discharge as needed  - Refer to Case Management Department for coordinating discharge planning if the patient needs post-hospital services based on physician/advanced practitioner order or complex needs related to functional status, cognitive ability, or social support system  Outcome: Progressing     Problem: Knowledge Deficit  Goal: Patient/family/caregiver demonstrates understanding of disease process, treatment plan, medications, and discharge instructions  Description: Complete learning assessment and assess knowledge base    Interventions:  - Provide teaching at level of understanding  - Provide teaching via preferred learning methods  Outcome: Progressing     Problem: GENITOURINARY - ADULT  Goal: Maintains or returns to baseline urinary function  Description: INTERVENTIONS:  - Assess urinary function  - Encourage oral fluids to ensure adequate hydration if ordered  - Administer IV fluids as ordered to ensure adequate hydration  - Administer ordered medications as needed  - Offer frequent toileting  - Follow urinary retention protocol if ordered  Outcome: Progressing  Goal: Absence of urinary retention  Description: INTERVENTIONS:  - Assess patient’s ability to void and empty bladder  - Monitor I/O  - Bladder scan as needed  - Discuss with physician/AP medications to alleviate retention as needed  - Discuss catheterization for long term situations as appropriate  Outcome: Progressing  Goal: Urinary catheter remains patent  Description: INTERVENTIONS:  - Assess patency of urinary catheter  - If patient has a chronic campbell, consider changing catheter if non-functioning  - Follow guidelines for intermittent irrigation of non-functioning urinary catheter  Outcome: Progressing     Problem: METABOLIC, FLUID AND ELECTROLYTES - ADULT  Goal: Electrolytes maintained within normal limits  Description: INTERVENTIONS:  - Monitor labs and assess patient for signs and symptoms of electrolyte imbalances  - Administer electrolyte replacement as ordered  - Monitor response to electrolyte replacements, including repeat lab results as appropriate  - Instruct patient on fluid and nutrition as appropriate  Outcome: Progressing  Goal: Fluid balance maintained  Description: INTERVENTIONS:  - Monitor labs   - Monitor I/O and WT  - Instruct patient on fluid and nutrition as appropriate  - Assess for signs & symptoms of volume excess or deficit  Outcome: Progressing     Problem: GASTROINTESTINAL - ADULT  Goal: Minimal or absence of nausea and/or vomiting  Description: INTERVENTIONS:  - Administer IV fluids if ordered to ensure adequate hydration  - Maintain NPO status until nausea and vomiting are resolved  - Nasogastric tube if ordered  - Administer ordered antiemetic medications as needed  - Provide nonpharmacologic comfort measures as appropriate  - Advance diet as tolerated, if ordered  - Consider nutrition services referral to assist patient with adequate nutrition and appropriate food choices  Outcome: Progressing  Goal: Maintains or returns to baseline bowel function  Description: INTERVENTIONS:  - Assess bowel function  - Encourage oral fluids to ensure adequate hydration  - Administer IV fluids if ordered to ensure adequate hydration  - Administer ordered medications as needed  - Encourage mobilization and activity  - Consider nutritional services referral to assist patient with adequate nutrition and appropriate food choices  Outcome: Progressing  Goal: Maintains adequate nutritional intake  Description: INTERVENTIONS:  - Monitor percentage of each meal consumed  - Identify factors contributing to decreased intake, treat as appropriate  - Assist with meals as needed  - Monitor I&O, weight, and lab values if indicated  - Obtain nutrition services referral as needed  Outcome: Progressing     Problem: SKIN/TISSUE INTEGRITY - ADULT  Goal: Skin Integrity remains intact(Skin Breakdown Prevention)  Description: Assess:  -Perform Willie assessment every x  -Clean and moisturize skin every x  -Inspect skin when repositioning, toileting, and assisting with ADLS  -Assess under medical devices such as x every x  -Assess extremities for adequate circulation and sensation     Bed Management:  -Have minimal linens on bed & keep smooth, unwrinkled  -Change linens as needed when moist or perspiring  -Avoid sitting or lying in one position for more than x hours while in bed  -Keep HOB at [facility]     Toileting:  -Offer bedside commode  -Assess for incontinence every x  -Use incontinent care products after each incontinent episode such as x    Activity:  -Mobilize patient x times a day  -Encourage activity and walks on unit  -Encourage or provide ROM exercises   -Turn and reposition patient every x Hours  -Use appropriate equipment to lift or move patient in bed  -Instruct/ Assist with weight shifting every x when out of bed in chair  -Consider limitation of chair time x hour intervals    Skin Care:  -Avoid use of baby powder, tape, friction and shearing, hot water or constrictive clothing  -Relieve pressure over bony prominences using x  -Do not massage red bony areas    Next Steps:  -Teach patient strategies to minimize risks such as x   -Consider consults to  interdisciplinary teams such as x  Outcome: Progressing     Problem: HEMATOLOGIC - ADULT  Goal: Maintains hematologic stability  Description: INTERVENTIONS  - Assess for signs and symptoms of bleeding or hemorrhage  - Monitor labs  - Administer supportive blood products/factors as ordered and appropriate  Outcome: Progressing     Problem: MUSCULOSKELETAL - ADULT  Goal: Maintain or return mobility to safest level of function  Description: INTERVENTIONS:  - Assess patient's ability to carry out ADLs; assess patient's baseline for ADL function and identify physical deficits which impact ability to perform ADLs (bathing, care of mouth/teeth, toileting, grooming, dressing, etc )  - Assess/evaluate cause of self-care deficits   - Assess range of motion  - Assess patient's mobility  - Assess patient's need for assistive devices and provide as appropriate  - Encourage maximum independence but intervene and supervise when necessary  - Involve family in performance of ADLs  - Assess for home care needs following discharge   - Consider OT consult to assist with ADL evaluation and planning for discharge  - Provide patient education as appropriate  Outcome: Progressing     Problem: Nutrition/Hydration-ADULT  Goal: Nutrient/Hydration intake appropriate for improving, restoring or maintaining nutritional needs  Description: Monitor and assess patient's nutrition/hydration status for malnutrition  Collaborate with interdisciplinary team and initiate plan and interventions as ordered  Monitor patient's weight and dietary intake as ordered or per policy  Utilize nutrition screening tool and intervene as necessary  Determine patient's food preferences and provide high-protein, high-caloric foods as appropriate       INTERVENTIONS:  - Monitor oral intake, urinary output, labs, and treatment plans  - Assess nutrition and hydration status and recommend course of action  - Evaluate amount of meals eaten  - Assist patient with eating if necessary   - Allow adequate time for meals  - Recommend/ encourage appropriate diets, oral nutritional supplements, and vitamin/mineral supplements  - Order, calculate, and assess calorie counts as needed  - Recommend, monitor, and adjust tube feedings and TPN/PPN based on assessed needs  - Assess need for intravenous fluids  - Provide specific nutrition/hydration education as appropriate  - Include patient/family/caregiver in decisions related to nutrition  Outcome: Progressing     Problem: Potential for Falls  Goal: Patient will remain free of falls  Description: INTERVENTIONS:  - Educate patient/family on patient safety including physical limitations  - Instruct patient to call for assistance with activity   - Consult OT/PT to assist with strengthening/mobility   - Keep Call bell within reach  - Keep bed low and locked with side rails adjusted as appropriate  - Keep care items and personal belongings within reach  - Initiate and maintain comfort rounds  - Make Fall Risk Sign visible to staff  - Offer Toileting every 2 Hours, in advance of need  - Obtain necessary fall risk management equipment: nonskid footwear  - Apply yellow socks and bracelet for high fall risk patients  - Consider moving patient to room near nurses station  Outcome: Progressing     Problem: Prexisting or High Potential for Compromised Skin Integrity  Goal: Skin integrity is maintained or improved  Description: INTERVENTIONS:  - Identify patients at risk for skin breakdown  - Assess and monitor skin integrity  - Assess and monitor nutrition and hydration status  - Monitor labs   - Assess for incontinence   - Turn and reposition patient  - Assist with mobility/ambulation  - Relieve pressure over bony prominences  - Avoid friction and shearing  - Provide appropriate hygiene as needed including keeping skin clean and dry  - Evaluate need for skin moisturizer/barrier cream  - Collaborate with interdisciplinary team   - Patient/family teaching  - Consider wound care consult   Outcome: Progressing

## 2022-10-15 NOTE — PLAN OF CARE
Problem: PAIN - ADULT  Goal: Verbalizes/displays adequate comfort level or baseline comfort level  Description: Interventions:  - Encourage patient to monitor pain and request assistance  - Assess pain using appropriate pain scale  - Administer analgesics based on type and severity of pain and evaluate response  - Implement non-pharmacological measures as appropriate and evaluate response  - Consider cultural and social influences on pain and pain management  - Notify physician/advanced practitioner if interventions unsuccessful or patient reports new pain  10/15/2022 1348 by Libby Germain RN  Outcome: Adequate for Discharge  10/15/2022 0954 by Libby Germain RN  Outcome: Progressing     Problem: INFECTION - ADULT  Goal: Absence or prevention of progression during hospitalization  Description: INTERVENTIONS:  - Assess and monitor for signs and symptoms of infection  - Monitor lab/diagnostic results  - Monitor all insertion sites, i e  indwelling lines, tubes, and drains  - Monitor endotracheal if appropriate and nasal secretions for changes in amount and color  - [place] appropriate cooling/warming therapies per order  - Administer medications as ordered  - Instruct and encourage patient and family to use good hand hygiene technique  - Identify and instruct in appropriate isolation precautions for identified infection/condition  10/15/2022 1348 by Libby Germain RN  Outcome: Adequate for Discharge  10/15/2022 0954 by Libby Germain RN  Outcome: Progressing     Problem: SAFETY ADULT  Goal: Patient will remain free of falls  Description: INTERVENTIONS:  - Educate patient/family on patient safety including physical limitations  - Instruct patient to call for assistance with activity   - Consult OT/PT to assist with strengthening/mobility   - Keep Call bell within reach  - Keep bed low and locked with side rails adjusted as appropriate  - Keep care items and personal belongings within reach  - Initiate and maintain comfort rounds  - Make Fall Risk Sign visible to staff  - Offer Toileting every 2 Hours, in advance of need  - Obtain necessary fall risk management equipment: nonskid footwear  - Apply yellow socks and bracelet for high fall risk patients  - Consider moving patient to room near nurses station  10/15/2022 1348 by Whitney Reyes RN  Outcome: Adequate for Discharge  10/15/2022 0954 by Whitney Reyes RN  Outcome: Progressing     Problem: DISCHARGE PLANNING  Goal: Discharge to home or other facility with appropriate resources  Description: INTERVENTIONS:  - Identify barriers to discharge w/patient and caregiver  - Arrange for needed discharge resources and transportation as appropriate  - Identify discharge learning needs (meds, wound care, etc )  - Arrange for interpretive services to assist at discharge as needed  - Refer to Case Management Department for coordinating discharge planning if the patient needs post-hospital services based on physician/advanced practitioner order or complex needs related to functional status, cognitive ability, or social support system  10/15/2022 1348 by Whitney Reyes RN  Outcome: Adequate for Discharge  10/15/2022 0954 by Whitney Reyes RN  Outcome: Progressing     Problem: Knowledge Deficit  Goal: Patient/family/caregiver demonstrates understanding of disease process, treatment plan, medications, and discharge instructions  Description: Complete learning assessment and assess knowledge base    Interventions:  - Provide teaching at level of understanding  - Provide teaching via preferred learning methods  10/15/2022 1348 by Whitney Reyes RN  Outcome: Adequate for Discharge  10/15/2022 0954 by Whiteny Reyes RN  Outcome: Progressing     Problem: GENITOURINARY - ADULT  Goal: Maintains or returns to baseline urinary function  Description: INTERVENTIONS:  - Assess urinary function  - Encourage oral fluids to ensure adequate hydration if ordered  - Administer IV fluids as ordered to ensure adequate hydration  - Administer ordered medications as needed  - Offer frequent toileting  - Follow urinary retention protocol if ordered  10/15/2022 1348 by Reina Knig RN  Outcome: Adequate for Discharge  10/15/2022 0954 by Reina King RN  Outcome: Progressing  Goal: Absence of urinary retention  Description: INTERVENTIONS:  - Assess patient’s ability to void and empty bladder  - Monitor I/O  - Bladder scan as needed  - Discuss with physician/AP medications to alleviate retention as needed  - Discuss catheterization for long term situations as appropriate  10/15/2022 1348 by Reina King RN  Outcome: Adequate for Discharge  10/15/2022 0954 by Reina King RN  Outcome: Progressing  Goal: Urinary catheter remains patent  Description: INTERVENTIONS:  - Assess patency of urinary catheter  - If patient has a chronic campbell, consider changing catheter if non-functioning  - Follow guidelines for intermittent irrigation of non-functioning urinary catheter  10/15/2022 1348 by Reina King RN  Outcome: Adequate for Discharge  10/15/2022 0954 by Reina King RN  Outcome: Progressing     Problem: METABOLIC, FLUID AND ELECTROLYTES - ADULT  Goal: Electrolytes maintained within normal limits  Description: INTERVENTIONS:  - Monitor labs and assess patient for signs and symptoms of electrolyte imbalances  - Administer electrolyte replacement as ordered  - Monitor response to electrolyte replacements, including repeat lab results as appropriate  - Instruct patient on fluid and nutrition as appropriate  10/15/2022 1348 by Reina King RN  Outcome: Adequate for Discharge  10/15/2022 0954 by Reina King RN  Outcome: Progressing  Goal: Fluid balance maintained  Description: INTERVENTIONS:  - Monitor labs   - Monitor I/O and WT  - Instruct patient on fluid and nutrition as appropriate  - Assess for signs & symptoms of volume excess or deficit  10/15/2022 1348 by Reina King RN  Outcome: Adequate for Discharge  10/15/2022 1372 by Reina King RN  Outcome: Progressing     Problem: GASTROINTESTINAL - ADULT  Goal: Minimal or absence of nausea and/or vomiting  Description: INTERVENTIONS:  - Administer IV fluids if ordered to ensure adequate hydration  - Maintain NPO status until nausea and vomiting are resolved  - Nasogastric tube if ordered  - Administer ordered antiemetic medications as needed  - Provide nonpharmacologic comfort measures as appropriate  - Advance diet as tolerated, if ordered  - Consider nutrition services referral to assist patient with adequate nutrition and appropriate food choices  10/15/2022 1348 by Reina King RN  Outcome: Adequate for Discharge  10/15/2022 0954 by Reina King RN  Outcome: Progressing  Goal: Maintains or returns to baseline bowel function  Description: INTERVENTIONS:  - Assess bowel function  - Encourage oral fluids to ensure adequate hydration  - Administer IV fluids if ordered to ensure adequate hydration  - Administer ordered medications as needed  - Encourage mobilization and activity  - Consider nutritional services referral to assist patient with adequate nutrition and appropriate food choices  10/15/2022 1348 by Reina King RN  Outcome: Adequate for Discharge  10/15/2022 0954 by Reina King RN  Outcome: Progressing  Goal: Maintains adequate nutritional intake  Description: INTERVENTIONS:  - Monitor percentage of each meal consumed  - Identify factors contributing to decreased intake, treat as appropriate  - Assist with meals as needed  - Monitor I&O, weight, and lab values if indicated  - Obtain nutrition services referral as needed  10/15/2022 1348 by Reina King RN  Outcome: Adequate for Discharge  10/15/2022 0954 by Reina King RN  Outcome: Progressing     Problem: SKIN/TISSUE INTEGRITY - ADULT  Goal: Skin Integrity remains intact(Skin Breakdown Prevention)  Description: Assess:  -Perform Willie assessment every x  -Clean and moisturize skin every x  -Inspect skin when repositioning, toileting, and assisting with ADLS  -Assess under medical devices such as x every x  -Assess extremities for adequate circulation and sensation     Bed Management:  -Have minimal linens on bed & keep smooth, unwrinkled  -Change linens as needed when moist or perspiring  -Avoid sitting or lying in one position for more than x hours while in bed  -Keep HOB at [facility]     Toileting:  -Offer bedside commode  -Assess for incontinence every x  -Use incontinent care products after each incontinent episode such as xxxxx    Activity:  -Mobilize patient x times a day  -Encourage activity and walks on unit  -Encourage or provide ROM exercises   -Turn and reposition patient every x Hours  -Use appropriate equipment to lift or move patient in bed  -Instruct/ Assist with weight shifting every x when out of bed in chair  -Consider limitation of chair time x hour intervals    Skin Care:  -Avoid use of baby powder, tape, friction and shearing, hot water or constrictive clothing  -Relieve pressure over bony prominences using x  -Do not massage red bony areas    Next Steps:  -Teach patient strategies to minimize risks such as x   -Consider consults to  interdisciplinary teams such as x  10/15/2022 1348 by Angi Pickens RN  Outcome: Adequate for Discharge  10/15/2022 0954 by Angi Pickens RN  Outcome: Progressing     Problem: HEMATOLOGIC - ADULT  Goal: Maintains hematologic stability  Description: INTERVENTIONS  - Assess for signs and symptoms of bleeding or hemorrhage  - Monitor labs  - Administer supportive blood products/factors as ordered and appropriate  10/15/2022 1348 by Angi Pickens RN  Outcome: Adequate for Discharge  10/15/2022 0954 by Angi Pickens RN  Outcome: Progressing     Problem: MUSCULOSKELETAL - ADULT  Goal: Maintain or return mobility to safest level of function  Description: INTERVENTIONS:  - Assess patient's ability to carry out ADLs; assess patient's baseline for ADL function and identify physical deficits which impact ability to perform ADLs (bathing, care of mouth/teeth, toileting, grooming, dressing, etc )  - Assess/evaluate cause of self-care deficits   - Assess range of motion  - Assess patient's mobility  - Assess patient's need for assistive devices and provide as appropriate  - Encourage maximum independence but intervene and supervise when necessary  - Involve family in performance of ADLs  - Assess for home care needs following discharge   - Consider OT consult to assist with ADL evaluation and planning for discharge  - Provide patient education as appropriate  10/15/2022 1348 by Viry Pro RN  Outcome: Adequate for Discharge  10/15/2022 0954 by Viry Pro RN  Outcome: Progressing     Problem: Nutrition/Hydration-ADULT  Goal: Nutrient/Hydration intake appropriate for improving, restoring or maintaining nutritional needs  Description: Monitor and assess patient's nutrition/hydration status for malnutrition  Collaborate with interdisciplinary team and initiate plan and interventions as ordered  Monitor patient's weight and dietary intake as ordered or per policy  Utilize nutrition screening tool and intervene as necessary  Determine patient's food preferences and provide high-protein, high-caloric foods as appropriate       INTERVENTIONS:  - Monitor oral intake, urinary output, labs, and treatment plans  - Assess nutrition and hydration status and recommend course of action  - Evaluate amount of meals eaten  - Assist patient with eating if necessary   - Allow adequate time for meals  - Recommend/ encourage appropriate diets, oral nutritional supplements, and vitamin/mineral supplements  - Order, calculate, and assess calorie counts as needed  - Recommend, monitor, and adjust tube feedings and TPN/PPN based on assessed needs  - Assess need for intravenous fluids  - Provide specific nutrition/hydration education as appropriate  - Include patient/family/caregiver in decisions related to nutrition  10/15/2022 1348 by Libby Germain RN  Outcome: Adequate for Discharge  10/15/2022 0954 by Libby Germain RN  Outcome: Progressing     Problem: Potential for Falls  Goal: Patient will remain free of falls  Description: INTERVENTIONS:  - Educate patient/family on patient safety including physical limitations  - Instruct patient to call for assistance with activity   - Consult OT/PT to assist with strengthening/mobility   - Keep Call bell within reach  - Keep bed low and locked with side rails adjusted as appropriate  - Keep care items and personal belongings within reach  - Initiate and maintain comfort rounds  - Make Fall Risk Sign visible to staff  - Offer Toileting every 2 Hours, in advance of need  - Obtain necessary fall risk management equipment: nonskid footwear  - Apply yellow socks and bracelet for high fall risk patients  - Consider moving patient to room near nurses station  10/15/2022 1348 by Libby Germain RN  Outcome: Adequate for Discharge  10/15/2022 0954 by Libby Germain RN  Outcome: Progressing     Problem: Prexisting or High Potential for Compromised Skin Integrity  Goal: Skin integrity is maintained or improved  Description: INTERVENTIONS:  - Identify patients at risk for skin breakdown  - Assess and monitor skin integrity  - Assess and monitor nutrition and hydration status  - Monitor labs   - Assess for incontinence   - Turn and reposition patient  - Assist with mobility/ambulation  - Relieve pressure over bony prominences  - Avoid friction and shearing  - Provide appropriate hygiene as needed including keeping skin clean and dry  - Evaluate need for skin moisturizer/barrier cream  - Collaborate with interdisciplinary team   - Patient/family teaching  - Consider wound care consult   10/15/2022 1348 by Libby Germain RN  Outcome: Adequate for Discharge  10/15/2022 0954 by Libby Germain RN  Outcome: Progressing

## 2022-10-15 NOTE — ASSESSMENT & PLAN NOTE
· Mendoza catheter placed in ED on 10/06   · Followed up with Urology on 10/07 with Mendoza replaced due to ongoing urinary retention  · 10/7 Cystoscopy with signs of longstanding bladder outlet obstruction with relatively small size of prostate minimal signs of bowel obstruction  · Patient has outpatient follow-up with Urology scheduled for outpatient void trial

## 2022-10-15 NOTE — ASSESSMENT & PLAN NOTE
· Sirs criteria met on admission:  Tachycardia and leukocytosis   · Suspected source is pyelonephritis as evidence by UA and CT with hydronephrosis   · Had been started on IV cefepime during hospitalization  · Blood cultures x2 after 48 hours  · No severe sepsis criteria met  · Leukocytosis resolved  · Urine culture growing > 100k ecoli  · Discharge on oral Levaquin to complete course

## 2022-10-15 NOTE — ASSESSMENT & PLAN NOTE
· CT with cholelithiasis but no signs of cholecystitis  · Labs reveal new transaminitis from 1 week ago at ,  - concern for obstructive bilirubin pattern with T bili at 1 409 alk-phos at 163  · May be related to infection  · Transaminitis continues to improve  · Patient denies any right upper quadrant abdominal pain or vomiting, he does admit to some nausea with fever yesterday  · Right upper quadrant ultrasound with cholelithiasis no evidence of acute cholecystitis    Mild hydronephrosis

## 2022-10-15 NOTE — ASSESSMENT & PLAN NOTE
· Sudden onset chills and fever with T-max of 103 4° F 10/12 afternoon   · Mendoza catheter present x1 week   · UA with innumerable bacteria and wbc's  · ED spoke with Urology, who felt patient could stay at this hospital to receive IV antibiotics   · Initially had been started on IV cefepime  · Urine culture growing >100k E coli  · Blood cultures negative x2 after 48 hours  · Discussed with urology - medically stable for discharge    Patient remains afebrile and leukocytosis has resolved  · Discharge on oral Levaquin to complete antibiotic course  · Patient has outpatient follow-up scheduled with Urology regarding void trial

## 2022-10-15 NOTE — PLAN OF CARE
Problem: PAIN - ADULT  Goal: Verbalizes/displays adequate comfort level or baseline comfort level  Description: Interventions:  - Encourage patient to monitor pain and request assistance  - Assess pain using appropriate pain scale  - Administer analgesics based on type and severity of pain and evaluate response  - Implement non-pharmacological measures as appropriate and evaluate response  - Consider cultural and social influences on pain and pain management  - Notify physician/advanced practitioner if interventions unsuccessful or patient reports new pain  Outcome: Progressing     Problem: SAFETY ADULT  Goal: Patient will remain free of falls  Description: INTERVENTIONS:  - Educate patient/family on patient safety including physical limitations  - Instruct patient to call for assistance with activity   - Consult OT/PT to assist with strengthening/mobility   - Keep Call bell within reach  - Keep bed low and locked with side rails adjusted as appropriate  - Keep care items and personal belongings within reach  - Initiate and maintain comfort rounds  - Make Fall Risk Sign visible to staff  - Offer Toileting every 2 Hours, in advance of need  - Obtain necessary fall risk management equipment: nonskid footwear  - Apply yellow socks and bracelet for high fall risk patients  - Consider moving patient to room near nurses station  Outcome: Progressing     Problem: Potential for Falls  Goal: Patient will remain free of falls  Description: INTERVENTIONS:  - Educate patient/family on patient safety including physical limitations  - Instruct patient to call for assistance with activity   - Consult OT/PT to assist with strengthening/mobility   - Keep Call bell within reach  - Keep bed low and locked with side rails adjusted as appropriate  - Keep care items and personal belongings within reach  - Initiate and maintain comfort rounds  - Make Fall Risk Sign visible to staff  - Offer Toileting every 2 Hours, in advance of need  - Obtain necessary fall risk management equipment: nonskid footwear  - Apply yellow socks and bracelet for high fall risk patients  - Consider moving patient to room near nurses station  Outcome: Progressing     Pt encouraged to ambulate carefully  Reports minimal pain

## 2022-10-15 NOTE — DISCHARGE INSTR - AVS FIRST PAGE
Follow-up with PCP within 1 week for post hospitalization follow-up  Follow-up with urology as previously scheduled  Continue antibiotic (levaquin) to complete treatment course    Return to the emergency department for further evaluation with any chest pain/palpitations, shortness of breath, nausea vomiting, abdominal pain, fever/chills

## 2022-10-18 LAB
BACTERIA BLD CULT: NORMAL
BACTERIA BLD CULT: NORMAL

## 2022-10-21 ENCOUNTER — APPOINTMENT (OUTPATIENT)
Dept: LAB | Facility: HOSPITAL | Age: 67
End: 2022-10-21
Attending: UROLOGY
Payer: MEDICARE

## 2022-10-21 DIAGNOSIS — N32.3 DIVERTICULA, BLADDER: ICD-10-CM

## 2022-10-21 DIAGNOSIS — R33.9 URINARY RETENTION: ICD-10-CM

## 2022-10-21 DIAGNOSIS — R33.8 ACUTE URINARY RETENTION: ICD-10-CM

## 2022-10-21 PROCEDURE — 84153 ASSAY OF PSA TOTAL: CPT

## 2022-10-22 LAB — PSA SERPL-MCNC: 1.8 NG/ML (ref 0–4)

## 2022-10-28 ENCOUNTER — PROCEDURE VISIT (OUTPATIENT)
Dept: UROLOGY | Facility: CLINIC | Age: 67
End: 2022-10-28

## 2022-10-28 VITALS
DIASTOLIC BLOOD PRESSURE: 80 MMHG | HEIGHT: 69 IN | SYSTOLIC BLOOD PRESSURE: 118 MMHG | RESPIRATION RATE: 20 BRPM | BODY MASS INDEX: 29.18 KG/M2 | WEIGHT: 197 LBS | HEART RATE: 64 BPM

## 2022-10-28 DIAGNOSIS — R33.8 ACUTE URINARY RETENTION: ICD-10-CM

## 2022-10-28 DIAGNOSIS — R33.9 URINARY RETENTION: Primary | ICD-10-CM

## 2022-10-28 LAB — POST-VOID RESIDUAL VOLUME, ML POC: 599 ML

## 2022-10-28 RX ORDER — TAMSULOSIN HYDROCHLORIDE 0.4 MG/1
0.4 CAPSULE ORAL
Qty: 30 CAPSULE | Refills: 6 | Status: SHIPPED | OUTPATIENT
Start: 2022-10-28

## 2022-10-28 NOTE — PROGRESS NOTES
10/28/2022    Anjel Brown  1955  [id number]    Diagnosis  Chief Complaint     Urinary Retention          Patient presents for void trial managed by Dr Kandy Sethi  Monthly campbell changes  Urodynamics scheduled for 1/25/2023 followed by an office visit with Dr Ute Blackburn 2/7/2023  Patient considering CIC  Procedure Campbell removal/voiding trial    Campbell catheter removed after deflation of an intact balloon  Patient tolerated well  Encouraged patient to hydrate well and return this afternoon for post void residual   he knows he may return early if uncomfortable and unable to urinate  Patient agrees to this plan  Patient returned this afternoon  Patient states unable to void  Patient voided 0 ml while in office  Bladder ultrasound performed and PVR measured 600 ml  Discussed options for bladder drainage either indwelling campbell or CIC  Patient will consider CIC but wishes indwelling campbell for now  Patient draped and prepped with betadine, lidocaine 2% instilled into the urethra, 16 fr coude campbell inserted without incident  Bladder drained for 600 ml clear yellow urine  Campbell attached to leg bag      Vitals:    10/28/22 0826   BP: 118/80   Pulse: 64   Resp: 20   Weight: 89 4 kg (197 lb)   Height: 5' 9" (1 753 m)           Will Josemanuel

## 2022-11-03 ENCOUNTER — TELEPHONE (OUTPATIENT)
Dept: UROLOGY | Facility: CLINIC | Age: 67
End: 2022-11-03

## 2022-11-04 NOTE — TELEPHONE ENCOUNTER
Called and spoke with patient's wife Deonte Brandt  Patient interested in CIC  Has appointment 11/30/2022 at the [facility] office  Jane asking the cost of the catheters  Will reach out to Bubba Akbar from [facility] then call Deonte Brandt back

## 2022-11-15 ENCOUNTER — HOSPITAL ENCOUNTER (OUTPATIENT)
Dept: ULTRASOUND IMAGING | Facility: HOSPITAL | Age: 67
Discharge: HOME | End: 2022-11-15

## 2022-11-15 DIAGNOSIS — R33.9 URINARY RETENTION: ICD-10-CM

## 2022-11-30 ENCOUNTER — PROCEDURE VISIT (OUTPATIENT)
Dept: UROLOGY | Facility: HOSPITAL | Age: 67
End: 2022-11-30

## 2022-11-30 VITALS
HEIGHT: 68 IN | SYSTOLIC BLOOD PRESSURE: 110 MMHG | DIASTOLIC BLOOD PRESSURE: 70 MMHG | BODY MASS INDEX: 30.49 KG/M2 | HEART RATE: 77 BPM | WEIGHT: 201.2 LBS | OXYGEN SATURATION: 97 %

## 2022-11-30 DIAGNOSIS — R33.9 URINARY RETENTION: ICD-10-CM

## 2022-11-30 NOTE — PROGRESS NOTES
11/30/2022    Jaycee Salter  1955  [id number]    Diagnosis  Chief Complaint    Urinary Retention         Patient presents for campbell removal/ CIC teaching managed by Dr Katarina Calhoun  Patient has follow up 1/25/23 for urodynamics    Procedure Campbell removal/voiding trial    Campbell catheter removed after deflation of an intact balloon  Patient tolerated well  11/30/2022    Jaycee Salter is a 78 y o  male  [id number]    Diagnosis:  Chief Complaint    Urinary Retention         Patient presents for   Clean intermittent catheterization teaching managed by Dr Katarina Calhoun:  · Patient provided with catheter samples and a copy of written instructions  · Patient will catheterize 4 x daily or every 4 hours  · Catheter ordered submitted today to [facility] for size 14F14 coude  · Patient will Follow up in 2 months  · Patient knows to call the office with any concerns, problems with supplies or difficulty passing catheter  · Orders sent to [facility]      Vitals:    11/30/22 1457   BP: 110/70   Pulse: 77   SpO2: 97%   Weight: 91 3 kg (201 lb 3 2 oz)   Height: 5' 8" (1 727 m)         Teaching:    Patient was given written instructions  He was able to demonstrate CIC  He states it is something is able to perform  Adam Fernando RN

## 2022-12-14 PROBLEM — N12 PYELONEPHRITIS: Status: RESOLVED | Noted: 2022-10-13 | Resolved: 2022-12-14

## 2023-01-23 ENCOUNTER — TELEPHONE (OUTPATIENT)
Dept: UROLOGY | Facility: CLINIC | Age: 68
End: 2023-01-23

## 2023-01-23 NOTE — TELEPHONE ENCOUNTER
Returned call to patient and discussed in detail process of urodynamic testing and what upcoming appointment on 1/25 will entail  Patient has been performing CIC every 6 hours and states he knows the cause of his bladder not emptying is due to nerve damage and that he has a neurogenic bladder  He feels as though testing will not reveal anything further and he does not wish to have a surgical procedure regardless  He requested to cancel urodynamics testing on 1/25  Patient wishes to sit down with Dr Esteban Quintero as scheduled on 2/7 to further discuss some questions he has

## 2023-02-06 NOTE — PROGRESS NOTES
UROLOGY FOLLOWUP NOTE     CHIEF COMPLAINT   Vilma Alexander is a 78 y o  male with a complaint of   Chief Complaint   Patient presents with   • Follow-up       History of Present Illness:     78 y o  male presented to the emergency room yesterday with left-sided flank pain  CT scan demonstrated significant hydronephrosis, and enlarged prostate with bladder diverticula  Patient had a Mendoza catheter placed for greater than 1 L of urinary retention  Was started on Flomax  Presents for discussion  Patient reports that he has not followed with medical providers regularly  Approximately 7-8 years ago, he did have a prostate exam was told he has an enlarged prostate  No prior PSA testing  Patient denies a family history of urinary issues or prostate cancer  Has been having progressive urinary symptoms oversewed many years  Does work as a   Patient was unable to void after our prior appointment  Offered clean intermittent catheterization testing  Has been using a coudé tip  We offer the patient urodynamics which was tentatively scheduled on 25 January  Patient ultimately canceled this procedure and presents today for discussion  Patient is doing well with 4 times a day catheterization and prefers to continue this  He would like to stop his Flomax      Lab Results   Component Value Date    PSA 1 8 10/21/2022      Past Medical History:     Past Medical History:   Diagnosis Date   • Urinary retention        PAST SURGICAL HISTORY:     Past Surgical History:   Procedure Laterality Date   • APPENDECTOMY     • CYSTOSCOPY  10/07/2022    [facility]       CURRENT MEDICATIONS:     Current Outpatient Medications   Medication Sig Dispense Refill   • ascorbic acid (VITAMIN C) 250 mg tablet Take 500 mg by mouth daily     • cholecalciferol (VITAMIN D3) 400 units tablet Take 400 Units by mouth daily     • cyanocobalamin (VITAMIN B-12) 500 MCG tablet Take 500 mcg by mouth daily     • Elderberry 500 MG CAPS Take by mouth     • famotidine (PEPCID) 20 mg tablet Take 20 mg by mouth 2 (two) times a day     • Grape Seed 100 MG CAPS Take 1 each by mouth 3 (three) times a day with meals     • Lactobacillus (PROBIOTIC ACIDOPHILUS PO) Take by mouth     • multivitamin (THERAGRAN) TABS Take 1 tablet by mouth daily     • pyridoxine (B-6) 100 MG tablet Take 100 mg by mouth daily     • Saw Palmetto, Serenoa repens, (Saw [place] Berries) 540 MG CAPS Take by mouth     • zinc gluconate 50 mg tablet Take 50 mg by mouth daily       No current facility-administered medications for this visit  ALLERGIES:   No Known Allergies    SOCIAL HISTORY:     Social History     Socioeconomic History   • Marital status: /Civil Union     Spouse name: None   • Number of children: None   • Years of education: None   • Highest education level: None   Occupational History   • None   Tobacco Use   • Smoking status: Never   • Smokeless tobacco: Never   Vaping Use   • Vaping Use: Never used   Substance and Sexual Activity   • Alcohol use: Not Currently   • Drug use: Never   • Sexual activity: Yes     Partners: Female   Other Topics Concern   • None   Social History Narrative   • None     Social Determinants of Health     Financial Resource Strain: Not on file   Food Insecurity: No Food Insecurity   • Worried About Running Out of Food in the Last Year: Never true   • Ran Out of Food in the Last Year: Never true   Transportation Needs: No Transportation Needs   • Lack of Transportation (Medical): No   • Lack of Transportation (Non-Medical):  No   Physical Activity: Not on file   Stress: Not on file   Social Connections: Not on file   Intimate Partner Violence: Not on file   Housing Stability: Low Risk    • Unable to Pay for Housing in the Last Year: No   • Number of Places Lived in the Last Year: 1   • Unstable Housing in the Last Year: No       SOCIAL HISTORY:     Family History   Problem Relation Age of Onset   • Diabetes Father    • Heart disease Mother        REVIEW OF SYSTEMS:     Review of Systems   Constitutional: Negative  Respiratory: Negative  Cardiovascular: Negative  Gastrointestinal: Negative  Genitourinary: Negative for difficulty urinating and penile pain  Musculoskeletal: Negative  Skin: Negative  Psychiatric/Behavioral: Negative  PHYSICAL EXAM:     /86 (BP Location: Left arm, Patient Position: Sitting, Cuff Size: Adult)   Pulse 92   Ht 5' 8" (1 727 m)   Wt 93 kg (205 lb)   SpO2 96%   BMI 31 17 kg/m²     General:  Healthy appearing male in no acute distress  They have a normal affect  There is not appear to be any gross neurologic defects or abnormalities  HEENT:  Normocephalic, atraumatic  Neck is supple without any palpable lymphadenopathy  Cardiovascular:  Patient has normal palpable distal radial pulses  There is no significant peripheral edema  No JVD is noted  Respiratory:  Patient has unlabored respirations  There is no audible wheeze or rhonchi  Abdomen:  Abdomen is with healed appendectomy surgical scars  Abdomen is soft and nontender  There is no tympany  Inguinal and umbilical hernia are not appreciated  Musculoskeletal:  Patient does not have significant CVA tenderness in the  flank with palpation or percussion  They full range of motion in all 4 extremities  Strength in all 4 extremities appears congruent  Patient is able to ambulate without assistance or difficulty  Dermatologic:  Patient has no skin abnormalities or rashes        LABS:     CBC:   Lab Results   Component Value Date    WBC 8 21 10/15/2022    HGB 13 9 10/15/2022    HCT 42 8 10/15/2022    MCV 90 10/15/2022     10/15/2022       BMP:   Lab Results   Component Value Date    CALCIUM 9 3 10/15/2022    K 3 8 10/15/2022    CO2 23 10/15/2022     10/15/2022    BUN 12 10/15/2022    CREATININE 1 10 10/15/2022     Lab Results   Component Value Date    PSA 1 8 10/21/2022       IMAGING:     10/6/22  CT ABDOMEN AND PELVIS WITHOUT IV CONTRAST - LOW DOSE RENAL STONE      INDICATION:   Flank pain, kidney stone suspected  left flank pain      COMPARISON:  None      TECHNIQUE:  Low radiation dose thin section CT examination of the abdomen and pelvis was performed without intravenous or oral contrast according to a protocol specifically designed to evaluate for urinary tract calculus  Axial, sagittal, and coronal 2D   reformatted images were created from the source data and submitted for interpretation  Evaluation for pathology in the abdomen and pelvis that is unrelated to urinary tract calculi is limited        Radiation dose length product (DLP) for this visit:  415 mGy-cm   This examination, like all CT scans performed in the [facility], was performed utilizing techniques to minimize radiation dose exposure, including the use of iterative   reconstruction and automated exposure control      URINARY TRACT FINDINGS:     RIGHT KIDNEY AND URETER:  Moderate severe right hydroureteronephrosis is noted  No urinary calculi are identified      LEFT KIDNEY AND URETER:  Moderate to severe left hydroureteronephrosis is identified  No urinary calculi identified  Marked bladder distention is identified  Multiple bladder diverticula are present      URINARY BLADDER:  Unremarkable         ADDITIONAL FINDINGS:     LOWER CHEST:  Small hiatal hernia noted  No other clinically significant abnormality identified in the visualized lower chest      SOLID VISCERA: Limited low radiation dose noncontrast CT evaluation demonstrates no clinically significant abnormality of the imaged portions of the liver, spleen, pancreas, or adrenal glands        GALLBLADDER/BILIARY TREE:  There are gallstone(s) within the gallbladder, without pericholecystic inflammatory changes  No biliary dilatation      STOMACH AND BOWEL:  Unremarkable      APPENDIX:  No findings to suggest appendicitis      ABDOMINOPELVIC CAVITY:  No ascites    No pneumoperitoneum  No lymphadenopathy      REPRODUCTIVE ORGANS:  Unremarkable for patient's age      ABDOMINAL WALL/INGUINAL REGIONS:  Unremarkable      OSSEOUS STRUCTURES:  No acute fracture or destructive osseous lesion      IMPRESSION:     Marked bladder distention and moderate to severe bilateral hydroureteronephrosis  Multiple bladder diverticula  Findings likely on the basis of chronic bladder outlet obstruction  No obstructing calculi identified      Cholelithiasis      Small hiatal hernia  ASSESSMENT:     78 y o  male with bladder outlet obstruction and hydronephrosis    PLAN:     Patient is doing very well with 4 times a day clean intermittent catheterization prefers to continue this  He is using cranberry juice and probiotics to avoid infections  We discussed updating his ultrasound and BMP now and having him return in 1 year with updated ultrasound, BMP and PSA for prostate cancer screening  He remains comfortable with this plan and is pleased to avoid indwelling catheterization  He prefers to avoid any additional testing including urodynamics which I think is very reasonable

## 2023-02-07 ENCOUNTER — OFFICE VISIT (OUTPATIENT)
Dept: UROLOGY | Facility: CLINIC | Age: 68
End: 2023-02-07

## 2023-02-07 VITALS
WEIGHT: 205 LBS | HEIGHT: 68 IN | SYSTOLIC BLOOD PRESSURE: 136 MMHG | OXYGEN SATURATION: 96 % | DIASTOLIC BLOOD PRESSURE: 86 MMHG | BODY MASS INDEX: 31.07 KG/M2 | HEART RATE: 92 BPM

## 2023-02-07 DIAGNOSIS — Z12.5 SCREENING FOR PROSTATE CANCER: ICD-10-CM

## 2023-02-07 DIAGNOSIS — R33.9 URINARY RETENTION: Primary | ICD-10-CM

## 2023-03-07 ENCOUNTER — APPOINTMENT (OUTPATIENT)
Dept: LAB | Facility: HOSPITAL | Age: 68
End: 2023-03-07
Attending: UROLOGY

## 2023-03-07 ENCOUNTER — HOSPITAL ENCOUNTER (OUTPATIENT)
Dept: ULTRASOUND IMAGING | Facility: HOSPITAL | Age: 68
Discharge: HOME | End: 2023-03-07
Attending: UROLOGY

## 2023-03-07 DIAGNOSIS — R33.9 URINARY RETENTION: ICD-10-CM

## 2023-03-07 LAB
ANION GAP SERPL CALCULATED.3IONS-SCNC: 6 MMOL/L (ref 4–13)
BUN SERPL-MCNC: 15 MG/DL (ref 5–25)
CALCIUM SERPL-MCNC: 9.5 MG/DL (ref 8.4–10.2)
CHLORIDE SERPL-SCNC: 104 MMOL/L (ref 96–108)
CO2 SERPL-SCNC: 30 MMOL/L (ref 21–32)
CREAT SERPL-MCNC: 1.01 MG/DL (ref 0.6–1.3)
GFR SERPL CREATININE-BSD FRML MDRD: 77 ML/MIN/1.73SQ M
GLUCOSE SERPL-MCNC: 71 MG/DL (ref 65–140)
POTASSIUM SERPL-SCNC: 4.2 MMOL/L (ref 3.5–5.3)
SODIUM SERPL-SCNC: 140 MMOL/L (ref 135–147)

## 2023-10-27 ENCOUNTER — TELEPHONE (OUTPATIENT)
Age: 68
End: 2023-10-27

## 2023-10-27 NOTE — TELEPHONE ENCOUNTER
Merline Shine from [facility] calling to get fax number of -[facility] office.

## 2023-11-01 NOTE — TELEPHONE ENCOUNTER
Received incoming call from Sam Zhou from [address] to discuss patients script. She reports since script was indicated for "80" or lifetime, this script is good for life per Medicare and additional script is not needed. Sam Zhou states patient last received 3 month catheter supply on 9/6 and [facility] will contact patient/spouse at the beginning of December to confirm next shipment. Call placed to patients wife and made her aware of same.

## 2023-11-01 NOTE — TELEPHONE ENCOUNTER
There is already a form in the chart form last December which indicates length of need is lifetime, not sure if they will be sending another form or if they just need last office note. I will fax last office note to [address] [facility] [phone] , but please advise client if we are awaiting a form from [address] or if form was already sent. There is an US kidney/bladder in chart already. Thank you.

## 2023-11-01 NOTE — TELEPHONE ENCOUNTER
Patient's wife called to make sure that his cath supplies prescription is renewed before his current one expires. She specifies that he's currently using:    Speedi Cath Flex Coude Pro 48086    They use [facility]. Pt is currently scheduled for his 1 yr follow up on 2/15 at 11 AM.    Please review chart to make sure pt's orders are in for updated ultrasound.     Call back [phone]

## 2023-11-01 NOTE — TELEPHONE ENCOUNTER
Called and spoke with Birdie Alvarez from [address] [place] [address] regarding patients script. System is currently down and she is unable to look up patients information at this time- plan to reboot her system and contact me directly to discuss script. Will await call back.

## 2024-01-22 ENCOUNTER — TELEPHONE (OUTPATIENT)
Age: 69
End: 2024-01-22

## 2024-01-22 NOTE — TELEPHONE ENCOUNTER
Patient's spouse called today with questions about any restrictions with the upcoming ultrasound and blood work.    I read over the Ultrasound prep, re: drinking water an hour prior.    I read over the fasting instructions for the BMP.     Caller verbalized understanding.

## 2024-01-26 ENCOUNTER — APPOINTMENT (OUTPATIENT)
Dept: LAB | Facility: HOSPITAL | Age: 69
End: 2024-01-26
Payer: MEDICARE

## 2024-01-26 DIAGNOSIS — R33.9 URINARY RETENTION: ICD-10-CM

## 2024-01-26 DIAGNOSIS — Z12.5 SCREENING FOR PROSTATE CANCER: ICD-10-CM

## 2024-01-26 LAB
ANION GAP SERPL CALCULATED.3IONS-SCNC: 5 MMOL/L
BUN SERPL-MCNC: 15 MG/DL (ref 5–25)
CALCIUM SERPL-MCNC: 9.9 MG/DL (ref 8.4–10.2)
CHLORIDE SERPL-SCNC: 104 MMOL/L (ref 96–108)
CO2 SERPL-SCNC: 29 MMOL/L (ref 21–32)
CREAT SERPL-MCNC: 1.17 MG/DL (ref 0.6–1.3)
GFR SERPL CREATININE-BSD FRML MDRD: 64 ML/MIN/1.73SQ M
GLUCOSE P FAST SERPL-MCNC: 110 MG/DL (ref 65–99)
POTASSIUM SERPL-SCNC: 4.6 MMOL/L (ref 3.5–5.3)
PSA SERPL-MCNC: 0.6 NG/ML (ref 0–4)
SODIUM SERPL-SCNC: 138 MMOL/L (ref 135–147)

## 2024-01-26 PROCEDURE — 80048 BASIC METABOLIC PNL TOTAL CA: CPT

## 2024-01-26 PROCEDURE — 36415 COLL VENOUS BLD VENIPUNCTURE: CPT

## 2024-01-26 PROCEDURE — G0103 PSA SCREENING: HCPCS

## 2024-02-02 ENCOUNTER — HOSPITAL ENCOUNTER (OUTPATIENT)
Dept: ULTRASOUND IMAGING | Facility: HOSPITAL | Age: 69
End: 2024-02-02
Attending: UROLOGY
Payer: MEDICARE

## 2024-02-02 DIAGNOSIS — R33.9 URINARY RETENTION: ICD-10-CM

## 2024-02-02 PROCEDURE — 76770 US EXAM ABDO BACK WALL COMP: CPT

## 2024-02-15 ENCOUNTER — OFFICE VISIT (OUTPATIENT)
Dept: UROLOGY | Facility: CLINIC | Age: 69
End: 2024-02-15
Payer: MEDICARE

## 2024-02-15 VITALS
HEIGHT: 69 IN | BODY MASS INDEX: 32.44 KG/M2 | WEIGHT: 219 LBS | HEART RATE: 61 BPM | OXYGEN SATURATION: 98 % | SYSTOLIC BLOOD PRESSURE: 118 MMHG | DIASTOLIC BLOOD PRESSURE: 72 MMHG

## 2024-02-15 DIAGNOSIS — Z12.5 SCREENING FOR PROSTATE CANCER: Primary | ICD-10-CM

## 2024-02-15 PROCEDURE — 99213 OFFICE O/P EST LOW 20 MIN: CPT | Performed by: UROLOGY

## 2024-02-15 NOTE — PROGRESS NOTES
ASSESSMENT:     68 y.o. male with bladder outlet obstruction and hydronephrosis    PLAN:     Patient continues with clean intermittent catheterization 4 times a day.  His wife and he have set alarms and schedules and he is extremely vigilant.  He is using Hebrew coudé tip prelubricated catheter and supplies through [employer] have been excellent for the patient and his wife.    I reviewed with him his ultrasound which shows resolution of any prior hydronephrosis.  Updated ultrasound and BMP ordered for 1 year    PSA values have reduced from prior year.  Will continue every other year PSA and digital rectal exam screening.        ---        UROLOGY FOLLOWUP NOTE     CHIEF COMPLAINT   Steve Hernandez is a 68 y.o. male with a complaint of   Chief Complaint   Patient presents with    Follow-up     1 yr f/u       History of Present Illness:     68 y.o. male presented to the emergency room yesterday with left-sided flank pain.  CT scan demonstrated significant hydronephrosis, and enlarged prostate with bladder diverticula.  Patient had a [place] catheter placed for greater than 1 L of urinary retention.  Was started on Flomax.  Presents for discussion.  Patient reports that he has not followed with medical providers regularly.  Approximately 7-8 years ago, he did have a prostate exam was told he has an enlarged prostate.  No prior PSA testing.  Patient denies a family history of urinary issues or prostate cancer.  Has been having progressive urinary symptoms oversewed many years.  Does work as a .    Patient has been doing beautifully with clean intermittent catheterization using a 14 Hebrew prelubricated catheter 4 times per day.  Over the last last year he has had no issues with infections.  Updated ultrasound looks excellent.    Lab Results   Component Value Date    PSA 0.60 01/26/2024    PSA 1.8 10/21/2022      Past Medical History:     Past Medical History:   Diagnosis Date    Urinary retention         PAST SURGICAL HISTORY:     Past Surgical History:   Procedure Laterality Date    APPENDECTOMY      CYSTOSCOPY  10/07/2022    [facility]       CURRENT MEDICATIONS:     Current Outpatient Medications   Medication Sig Dispense Refill    ascorbic acid (VITAMIN C) 250 mg tablet Take 500 mg by mouth daily      cholecalciferol (VITAMIN D3) 400 units tablet Take 400 Units by mouth daily      cyanocobalamin (VITAMIN B-12) 500 MCG tablet Take 500 mcg by mouth daily      Elderberry 500 MG CAPS Take by mouth      famotidine (PEPCID) 20 mg tablet Take 20 mg by mouth 2 (two) times a day      Grape Seed 100 MG CAPS Take 1 each by mouth 3 (three) times a day with meals      Lactobacillus (PROBIOTIC ACIDOPHILUS PO) Take by mouth      multivitamin (THERAGRAN) TABS Take 1 tablet by mouth daily      pyridoxine (B-6) 100 MG tablet Take 100 mg by mouth daily      Saw Palmetto, Serenoa repens, (Saw [place] Berries) 540 MG CAPS Take by mouth      zinc gluconate 50 mg tablet Take 50 mg by mouth daily       No current facility-administered medications for this visit.       ALLERGIES:   No Known Allergies    SOCIAL HISTORY:     Social History     Socioeconomic History    Marital status: /Civil Union     Spouse name: None    Number of children: None    Years of education: None    Highest education level: None   Occupational History    None   Tobacco Use    Smoking status: Never    Smokeless tobacco: Never   Vaping Use    Vaping status: Never Used   Substance and Sexual Activity    Alcohol use: Not Currently    Drug use: Never    Sexual activity: Yes     Partners: Female   Other Topics Concern    None   Social History Narrative    None     Social Determinants of Health     Financial Resource Strain: Not on file   Food Insecurity: No Food Insecurity (10/13/2022)    Hunger Vital Sign     Worried About Running Out of Food in the Last Year: Never true     Ran Out of Food in the Last Year: Never true   Transportation Needs: No  "Transportation Needs (10/13/2022)    PRAPARE - Transportation     Lack of Transportation (Medical): No     Lack of Transportation (Non-Medical): No   Physical Activity: Not on file   Stress: Not on file   Social Connections: Not on file   Intimate Partner Violence: Not on file   Housing Stability: Low Risk  (10/13/2022)    Housing Stability Vital Sign     Unable to Pay for Housing in the Last Year: No     Number of Places Lived in the Last Year: 1     Unstable Housing in the Last Year: No       SOCIAL HISTORY:     Family History   Problem Relation Age of Onset    Diabetes Father     Heart disease Mother        REVIEW OF SYSTEMS:     Review of Systems   Constitutional: Negative.    Respiratory: Negative.     Cardiovascular: Negative.    Gastrointestinal: Negative.    Genitourinary:  Negative for difficulty urinating and penile pain.   Musculoskeletal: Negative.    Skin: Negative.    Psychiatric/Behavioral: Negative.           PHYSICAL EXAM:     /72 (BP Location: Left arm, Patient Position: Sitting, Cuff Size: Large)   Pulse 61   Ht 5' 9\" (1.753 m) Comment: per pt  Wt 99.3 kg (219 lb)   SpO2 98%   BMI 32.34 kg/m²     General:  Healthy appearing male in no acute distress.  They have a normal affect.  There is not appear to be any gross neurologic defects or abnormalities.  HEENT:  Normocephalic, atraumatic.  Neck is supple without any palpable lymphadenopathy  Cardiovascular:  Patient has normal palpable distal radial pulses.  There is no significant peripheral edema. No JVD is noted.  Respiratory:  Patient has unlabored respirations.  There is no audible wheeze or rhonchi.  Abdomen:  Abdomen is with healed appendectomy surgical scars.  Abdomen is soft and nontender.  There is no tympany.  Inguinal and umbilical hernia are not appreciated.  Musculoskeletal:  Patient does not have significant CVA tenderness in the  flank with palpation or percussion.  They full range of motion in all 4 extremities.  Strength " in all 4 extremities appears congruent.  Patient is able to ambulate without assistance or difficulty.  Dermatologic:  Patient has no skin abnormalities or rashes.      LABS:     CBC:   Lab Results   Component Value Date    WBC 8.21 10/15/2022    HGB 13.9 10/15/2022    HCT 42.8 10/15/2022    MCV 90 10/15/2022     10/15/2022       BMP:   Lab Results   Component Value Date    CALCIUM 9.9 2024    K 4.6 2024    CO2 29 2024     2024    BUN 15 2024    CREATININE 1.17 2024     Lab Results   Component Value Date    PSA 0.60 2024    PSA 1.8 10/21/2022       IMAGIN/2/24  RENAL ULTRASOUND WITH PVR     INDICATION: R33.9: Retention of urine, unspecified.     COMPARISON: Renal ultrasound 3/7/2023     TECHNIQUE: Ultrasound of the retroperitoneum was performed with a curvilinear transducer utilizing volumetric sweeps and still imaging techniques.     FINDINGS:     KIDNEYS:  Symmetric and normal size.  Right kidney: 11.4 x 6.0 x 6.1 cm. Volume 217.3 mL  Left kidney: 14.7 x 5.9 x 5.1 cm. Volume 232.0 mL     Right kidney  Normal echogenicity and contour.  No mass is identified.  No hydronephrosis.  No shadowing calculi.  No perinephric fluid collections.     Left kidney  Normal echogenicity and contour.  No mass is identified. 4.7 cm lower pole exophytic simple cyst. 2.9 cm lower pole parapelvic simple cyst.  No hydronephrosis.  No shadowing calculi.  No perinephric fluid collections.     URETERS:  Nonvisualized.     BLADDER:  Normally distended.  No focal thickening or mass lesions.  Bilateral ureteral jets detected.  Prevoid: 282.4  No significant post void volume. Measured post void volume in mL: 40.4        IMPRESSION:     Left renal simple cysts.     Remainder of the examination is normal.

## 2025-02-07 ENCOUNTER — APPOINTMENT (OUTPATIENT)
Dept: LAB | Facility: HOSPITAL | Age: 70
End: 2025-02-07
Payer: MEDICARE

## 2025-02-07 DIAGNOSIS — Z12.5 SCREENING FOR PROSTATE CANCER: ICD-10-CM

## 2025-02-07 LAB
ANION GAP SERPL CALCULATED.3IONS-SCNC: 5 MMOL/L (ref 4–13)
BUN SERPL-MCNC: 15 MG/DL (ref 5–25)
CALCIUM SERPL-MCNC: 9.5 MG/DL (ref 8.4–10.2)
CHLORIDE SERPL-SCNC: 106 MMOL/L (ref 96–108)
CO2 SERPL-SCNC: 29 MMOL/L (ref 21–32)
CREAT SERPL-MCNC: 1.09 MG/DL (ref 0.6–1.3)
GFR SERPL CREATININE-BSD FRML MDRD: 69 ML/MIN/1.73SQ M
GLUCOSE P FAST SERPL-MCNC: 101 MG/DL (ref 65–99)
POTASSIUM SERPL-SCNC: 4.6 MMOL/L (ref 3.5–5.3)
PSA SERPL-MCNC: 0.63 NG/ML (ref 0–4)
SODIUM SERPL-SCNC: 140 MMOL/L (ref 135–147)

## 2025-02-07 PROCEDURE — G0103 PSA SCREENING: HCPCS

## 2025-02-07 PROCEDURE — 36415 COLL VENOUS BLD VENIPUNCTURE: CPT

## 2025-02-07 PROCEDURE — 80048 BASIC METABOLIC PNL TOTAL CA: CPT

## 2025-02-10 ENCOUNTER — HOSPITAL ENCOUNTER (OUTPATIENT)
Dept: ULTRASOUND IMAGING | Facility: HOSPITAL | Age: 70
Discharge: HOME | End: 2025-02-10
Payer: MEDICARE

## 2025-02-10 DIAGNOSIS — Z12.5 SCREENING FOR PROSTATE CANCER: ICD-10-CM

## 2025-02-10 PROCEDURE — 76770 US EXAM ABDO BACK WALL COMP: CPT

## 2025-03-13 ENCOUNTER — OFFICE VISIT (OUTPATIENT)
Dept: UROLOGY | Facility: CLINIC | Age: 70
End: 2025-03-13
Payer: MEDICARE

## 2025-03-13 VITALS
HEART RATE: 76 BPM | SYSTOLIC BLOOD PRESSURE: 120 MMHG | WEIGHT: 218 LBS | OXYGEN SATURATION: 95 % | BODY MASS INDEX: 32.19 KG/M2 | DIASTOLIC BLOOD PRESSURE: 70 MMHG

## 2025-03-13 DIAGNOSIS — Z12.5 SCREENING FOR PROSTATE CANCER: Primary | ICD-10-CM

## 2025-03-13 DIAGNOSIS — R33.9 URINARY RETENTION: ICD-10-CM

## 2025-03-13 DIAGNOSIS — N13.30 HYDROURETERONEPHROSIS: ICD-10-CM

## 2025-03-13 PROCEDURE — 99213 OFFICE O/P EST LOW 20 MIN: CPT | Performed by: PHYSICIAN ASSISTANT

## 2025-03-13 NOTE — ASSESSMENT & PLAN NOTE
Resolved with catheter regimen  Yearly ultrasounds continue to show resolution.  Continue his catheter regimen as above  BMP 1 year  Orders:    Basic metabolic panel; Future    US kidney and bladder; Future

## 2025-03-13 NOTE — PROGRESS NOTES
Name: Steve Hernandez      : 1955      MRN: [id number]  Encounter Provider: María Loya PA-C  Encounter Date: 3/13/2025   Encounter department: [facility] UROLOGY [place] [facility]  :  Assessment & Plan  Screening for prostate cancer  Low stable PSA 0.6, continue yearly screenings  Orders:    PSA, Total Screen; Future    Urinary retention  Initially presented in high-volume retention with hydronephrosis.  He does not void on his own.  He is on a diligent regimen of clean intermittent catheterization every 6 hours.  This works well for him.  No infections bleeding or leakage between caths.    [facility] for supplies  This is a permanent need for him    Orders:    Basic metabolic panel; Future    US kidney and bladder; Future    Hydroureteronephrosis, right  Resolved with catheter regimen  Yearly ultrasounds continue to show resolution.  Continue his catheter regimen as above  BMP 1 year  Orders:    Basic metabolic panel; Future    US kidney and bladder; Future        History of Present Illness   Steve Hernandez is a 69 y.o. male who presents annual follow-up bladder outlet obstruction initially presenting in  with flank pain and over 1 L of urinary retention with  hydronephrosis.  Prostatic enlargement and bladder diverticula also seen on imaging.  Following this he failed voiding trials.  He was taught self-catheterization.  He deferred urodynamics since the self cathing was going well.  Patient continues with clean intermittent catheterization 4 times daily.  He is vigilant with this.  Does not void on his own.  No trouble passing the catheter no infections or hematuria in the past year.  Presents today with an updated ultrasound showing no hydronephrosis no urinary tract calculi stable simple cysts on the left kidney, prostate measurement 35 cc.    Creatinine 1.0  PSA 0.6    Review of Systems   Constitutional: Negative.    Respiratory: Negative.     Cardiovascular: Negative.    Genitourinary:   Positive for difficulty urinating. Negative for decreased urine volume, dysuria, flank pain, frequency, hematuria and urgency.   Musculoskeletal: Negative.           Objective   /70 (BP Location: Left arm, Patient Position: Sitting, Cuff Size: Standard)   Pulse 76   Wt 98.9 kg (218 lb)   SpO2 95%   BMI 32.19 kg/m²     Physical Exam  Vitals and nursing note reviewed.   Constitutional:       General: He is not in acute distress.     Appearance: He is well-developed. He is not diaphoretic.   HENT:      Head: Normocephalic and atraumatic.   Pulmonary:      Effort: Pulmonary effort is normal.   Musculoskeletal:      Right lower leg: No edema.      Left lower leg: No edema.   Skin:     General: Skin is warm.   Neurological:      Mental Status: He is alert and oriented to person, place, and time.           Results   Lab Results   Component Value Date    PSA 0.631 02/07/2025    PSA 0.60 01/26/2024    PSA 1.8 10/21/2022     Lab Results   Component Value Date    CALCIUM 9.5 02/07/2025    K 4.6 02/07/2025    CO2 29 02/07/2025     02/07/2025    BUN 15 02/07/2025    CREATININE 1.09 02/07/2025     Lab Results   Component Value Date    WBC 8.21 10/15/2022    HGB 13.9 10/15/2022    HCT 42.8 10/15/2022    MCV 90 10/15/2022     10/15/2022       Office Urine Dip  No results found for this or any previous visit (from the past hour).

## 2025-03-13 NOTE — ASSESSMENT & PLAN NOTE
Initially presented in high-volume retention with hydronephrosis.  He does not void on his own.  He is on a diligent regimen of clean intermittent catheterization every 6 hours.  This works well for him.  No infections bleeding or leakage between caths.    [facility] for supplies  This is a permanent need for him    Orders:    Basic metabolic panel; Future    US kidney and bladder; Future